# Patient Record
Sex: FEMALE | Race: WHITE | NOT HISPANIC OR LATINO | Employment: UNEMPLOYED | URBAN - METROPOLITAN AREA
[De-identification: names, ages, dates, MRNs, and addresses within clinical notes are randomized per-mention and may not be internally consistent; named-entity substitution may affect disease eponyms.]

---

## 2020-08-07 ENCOUNTER — OFFICE VISIT (OUTPATIENT)
Dept: INTERNAL MEDICINE | Facility: CLINIC | Age: 50
End: 2020-08-07
Payer: COMMERCIAL

## 2020-08-07 ENCOUNTER — LAB VISIT (OUTPATIENT)
Dept: LAB | Facility: HOSPITAL | Age: 50
End: 2020-08-07
Attending: INTERNAL MEDICINE
Payer: COMMERCIAL

## 2020-08-07 ENCOUNTER — IMMUNIZATION (OUTPATIENT)
Dept: PHARMACY | Facility: CLINIC | Age: 50
End: 2020-08-07
Payer: COMMERCIAL

## 2020-08-07 VITALS
SYSTOLIC BLOOD PRESSURE: 112 MMHG | WEIGHT: 108 LBS | BODY MASS INDEX: 19.88 KG/M2 | HEIGHT: 62 IN | DIASTOLIC BLOOD PRESSURE: 68 MMHG

## 2020-08-07 DIAGNOSIS — Z00.00 ROUTINE HISTORY AND PHYSICAL EXAMINATION OF ADULT: ICD-10-CM

## 2020-08-07 DIAGNOSIS — F41.9 ANXIETY: ICD-10-CM

## 2020-08-07 DIAGNOSIS — Z11.59 NEED FOR HEPATITIS C SCREENING TEST: ICD-10-CM

## 2020-08-07 DIAGNOSIS — Z01.419 WELL WOMAN EXAM: ICD-10-CM

## 2020-08-07 DIAGNOSIS — Z12.39 BREAST CANCER SCREENING: ICD-10-CM

## 2020-08-07 DIAGNOSIS — Z00.00 ROUTINE HISTORY AND PHYSICAL EXAMINATION OF ADULT: Primary | ICD-10-CM

## 2020-08-07 DIAGNOSIS — Z87.892 HISTORY OF ANAPHYLAXIS: ICD-10-CM

## 2020-08-07 LAB
ALBUMIN SERPL BCP-MCNC: 4.3 G/DL (ref 3.5–5.2)
ALP SERPL-CCNC: 54 U/L (ref 55–135)
ALT SERPL W/O P-5'-P-CCNC: 11 U/L (ref 10–44)
ANION GAP SERPL CALC-SCNC: 6 MMOL/L (ref 8–16)
AST SERPL-CCNC: 18 U/L (ref 10–40)
BASOPHILS # BLD AUTO: 0.05 K/UL (ref 0–0.2)
BASOPHILS NFR BLD: 0.8 % (ref 0–1.9)
BILIRUB SERPL-MCNC: 0.4 MG/DL (ref 0.1–1)
BUN SERPL-MCNC: 9 MG/DL (ref 6–20)
CALCIUM SERPL-MCNC: 9.6 MG/DL (ref 8.7–10.5)
CHLORIDE SERPL-SCNC: 105 MMOL/L (ref 95–110)
CHOLEST SERPL-MCNC: 222 MG/DL (ref 120–199)
CHOLEST/HDLC SERPL: 4 {RATIO} (ref 2–5)
CO2 SERPL-SCNC: 28 MMOL/L (ref 23–29)
CREAT SERPL-MCNC: 0.8 MG/DL (ref 0.5–1.4)
DIFFERENTIAL METHOD: ABNORMAL
EOSINOPHIL # BLD AUTO: 0.1 K/UL (ref 0–0.5)
EOSINOPHIL NFR BLD: 2.1 % (ref 0–8)
ERYTHROCYTE [DISTWIDTH] IN BLOOD BY AUTOMATED COUNT: 13.9 % (ref 11.5–14.5)
EST. GFR  (AFRICAN AMERICAN): >60 ML/MIN/1.73 M^2
EST. GFR  (NON AFRICAN AMERICAN): >60 ML/MIN/1.73 M^2
ESTIMATED AVG GLUCOSE: 108 MG/DL (ref 68–131)
GLUCOSE SERPL-MCNC: 90 MG/DL (ref 70–110)
HBA1C MFR BLD HPLC: 5.4 % (ref 4–5.6)
HCT VFR BLD AUTO: 40.6 % (ref 37–48.5)
HDLC SERPL-MCNC: 55 MG/DL (ref 40–75)
HDLC SERPL: 24.8 % (ref 20–50)
HGB BLD-MCNC: 12.5 G/DL (ref 12–16)
IMM GRANULOCYTES # BLD AUTO: 0.02 K/UL (ref 0–0.04)
IMM GRANULOCYTES NFR BLD AUTO: 0.3 % (ref 0–0.5)
LDLC SERPL CALC-MCNC: 140 MG/DL (ref 63–159)
LYMPHOCYTES # BLD AUTO: 1.2 K/UL (ref 1–4.8)
LYMPHOCYTES NFR BLD: 19.6 % (ref 18–48)
MCH RBC QN AUTO: 28.3 PG (ref 27–31)
MCHC RBC AUTO-ENTMCNC: 30.8 G/DL (ref 32–36)
MCV RBC AUTO: 92 FL (ref 82–98)
MONOCYTES # BLD AUTO: 0.6 K/UL (ref 0.3–1)
MONOCYTES NFR BLD: 9.2 % (ref 4–15)
NEUTROPHILS # BLD AUTO: 4.2 K/UL (ref 1.8–7.7)
NEUTROPHILS NFR BLD: 68 % (ref 38–73)
NONHDLC SERPL-MCNC: 167 MG/DL
NRBC BLD-RTO: 0 /100 WBC
PLATELET # BLD AUTO: 206 K/UL (ref 150–350)
PMV BLD AUTO: 12.2 FL (ref 9.2–12.9)
POTASSIUM SERPL-SCNC: 4.2 MMOL/L (ref 3.5–5.1)
PROT SERPL-MCNC: 7.5 G/DL (ref 6–8.4)
RBC # BLD AUTO: 4.41 M/UL (ref 4–5.4)
SODIUM SERPL-SCNC: 139 MMOL/L (ref 136–145)
TRIGL SERPL-MCNC: 135 MG/DL (ref 30–150)
TSH SERPL DL<=0.005 MIU/L-ACNC: 2.28 UIU/ML (ref 0.4–4)
WBC # BLD AUTO: 6.11 K/UL (ref 3.9–12.7)

## 2020-08-07 PROCEDURE — 99396 PR PREVENTIVE VISIT,EST,40-64: ICD-10-PCS | Mod: S$GLB,,, | Performed by: INTERNAL MEDICINE

## 2020-08-07 PROCEDURE — 84443 ASSAY THYROID STIM HORMONE: CPT

## 2020-08-07 PROCEDURE — 36415 COLL VENOUS BLD VENIPUNCTURE: CPT

## 2020-08-07 PROCEDURE — 85025 COMPLETE CBC W/AUTO DIFF WBC: CPT

## 2020-08-07 PROCEDURE — 80061 LIPID PANEL: CPT

## 2020-08-07 PROCEDURE — 83036 HEMOGLOBIN GLYCOSYLATED A1C: CPT

## 2020-08-07 PROCEDURE — 99999 PR PBB SHADOW E&M-NEW PATIENT-LVL IV: ICD-10-PCS | Mod: PBBFAC,,, | Performed by: INTERNAL MEDICINE

## 2020-08-07 PROCEDURE — 80053 COMPREHEN METABOLIC PANEL: CPT

## 2020-08-07 PROCEDURE — 3008F PR BODY MASS INDEX (BMI) DOCUMENTED: ICD-10-PCS | Mod: CPTII,S$GLB,, | Performed by: INTERNAL MEDICINE

## 2020-08-07 PROCEDURE — 99999 PR PBB SHADOW E&M-NEW PATIENT-LVL IV: CPT | Mod: PBBFAC,,, | Performed by: INTERNAL MEDICINE

## 2020-08-07 PROCEDURE — 99396 PREV VISIT EST AGE 40-64: CPT | Mod: S$GLB,,, | Performed by: INTERNAL MEDICINE

## 2020-08-07 PROCEDURE — 86803 HEPATITIS C AB TEST: CPT

## 2020-08-07 PROCEDURE — 3008F BODY MASS INDEX DOCD: CPT | Mod: CPTII,S$GLB,, | Performed by: INTERNAL MEDICINE

## 2020-08-07 NOTE — PROGRESS NOTES
CHIEF COMPLAINT     Chief Complaint   Patient presents with    Establish Care     is getting established as a new patient.     Mammogram     reports that she has lumps in ANDREA breasts, has missed last (2) 6 mos appts. patient was seen [facility].     Annual Exam       HPI     Sylvia Chua is a 50 y.o. female here today for annual exam.  Patient has just moved back to [place] from [place].  Here today to reestablish primary care.    Significant health changes in the past year.  Patient is perimenopausal now.  She reports that she goes anywhere from 1-3 months between periods.  Also reports some mild hot flashes that are not particularly bothersome.    Anxiety  Patient reports she has recurred covering from back to anxiety.  Feels like symptoms were brought on regarding transition prior to moving and moving.  Reports her some financial strain, some family health strain with her parents.  She reports that she is anxious she is interested in speaking with somebody does not feel like symptoms were medication at this time.  No previous history of anxiety although she reports that her kids reports that she has always been anxious.    Subclinical hypothyroidism  Had a TSH that was slightly elevated in 2009 with normal T4.  Subsequent TSH measurements boys been normal.  Patient denies any symptoms of hypo or hyperthyroidism.  Patient has never been evaluated by specialist.  Patient about medication prescription.    Personally Reviewed Patient's Medical, surgical, family and social hx. Changes updated in [facility].  Care Team updated in Epic    Review of Systems:  Review of Systems   Constitutional: Negative for fever and unexpected weight change.   Respiratory: Negative for cough.    Gastrointestinal: Negative for constipation and diarrhea.   Endocrine: Negative for cold intolerance and heat intolerance.   Genitourinary:        Oligomenorrhea  Hot flashes   Neurological: Negative for tremors.  "  Psychiatric/Behavioral: The patient is nervous/anxious.        Health Maintenance:   Reviewed with patient  Due for the following:      PHYSICAL EXAM     /68 (BP Location: Left arm, Patient Position: Sitting, BP Method: Medium (Manual))   Ht 5' 2" (1.575 m)   Wt 49 kg (108 lb)   BMI 19.75 kg/m²     Gen: Well Appearing, NAD  HEENT: PERR, EOMI  Neck: FROM, no thyromegaly, no cervical adenopathy  CVD: RRR, no M/R/G  Pulm: Normal work of breathing, CTAB, no wheezing  Abd:  Soft, NT, ND non TTP, no mass  MSK: no LE edema  Neuro: A&Ox3, gait normal, speech normal  Mood; Mood normal, behavior normal, thought process linear       LABS     Labs reviewed; ordered today    ASSESSMENT     1. Routine history and physical examination of adult  CBC auto differential    Comprehensive metabolic panel    Lipid panel    Hemoglobin A1c    TSH   2. Breast cancer screening  Mammo Digital Screening Bilat with CAD   3. Need for hepatitis C screening test  Hepatitis C antibody   4. Anxiety  Ambulatory referral/consult to Community Health Workers (CHW)   5. Well woman exam  Ambulatory referral/consult to Gynecology   6. History of anaphylaxis  EPINEPHrine 0.3 mg/0.3 mL Syrg           Plan     Sylvia Chua is a 50 y.o. female with  1. Routine history and physical examination of adult  Updated problem list, medical history, care team and discussed HM.   Shingrix today  Patient wants to think about colonoscopy  - CBC auto differential; Future  - Comprehensive metabolic panel; Future  - Lipid panel; Future  - Hemoglobin A1c; Future  - TSH; Future    2. Breast cancer screening  History of abnormal mammograms but no history of malignancy.  She is overdue  - Mammo Digital Screening Bilat with CAD; Future    3. Need for hepatitis C screening test  Never screened  - Hepatitis C antibody; Future    4. Anxiety  Symptoms are mild discussed lifestyle optimization, talk therapy and pharmacotherapy.  Patient adjusting pharmacotherapy " this time.  Interested in talking with family.  Will make referral.  If she does feel like warts medication would pick medication that may help with her hot flashes as well.  - Ambulatory referral/consult to Community Health Workers (CHW); Future    5. Well woman exam  Ready to establish the gyn  - Ambulatory referral/consult to Gynecology; Future    6. History of anaphylaxis   Bee venom is her trigger.  Will refill EpiPen  - EPINEPHrine 0.3 mg/0.3 mL Syrg; Inject 0.3 mLs (0.3 mg total) into the skin once. for 1 dose  Dispense: 2 each; Refill: 1      Cornelius Chan MD

## 2020-08-10 LAB — HCV AB SERPL QL IA: NEGATIVE

## 2020-08-15 ENCOUNTER — OFFICE VISIT (OUTPATIENT)
Dept: PRIMARY CARE CLINIC | Facility: CLINIC | Age: 50
End: 2020-08-15
Payer: COMMERCIAL

## 2020-08-15 DIAGNOSIS — R11.0 NAUSEA: ICD-10-CM

## 2020-08-15 DIAGNOSIS — Z20.822 CLOSE EXPOSURE TO COVID-19 VIRUS: ICD-10-CM

## 2020-08-15 DIAGNOSIS — R05.9 COUGH: Primary | ICD-10-CM

## 2020-08-15 DIAGNOSIS — A09 DIARRHEA OF INFECTIOUS ORIGIN: ICD-10-CM

## 2020-08-15 PROCEDURE — 99213 PR OFFICE/OUTPT VISIT, EST, LEVL III, 20-29 MIN: ICD-10-PCS | Mod: 95,,, | Performed by: FAMILY MEDICINE

## 2020-08-15 PROCEDURE — 99213 OFFICE O/P EST LOW 20 MIN: CPT | Mod: 95,,, | Performed by: FAMILY MEDICINE

## 2020-08-15 RX ORDER — AZITHROMYCIN 250 MG/1
TABLET, FILM COATED ORAL
Qty: 6 TABLET | Refills: 0 | Status: SHIPPED | OUTPATIENT
Start: 2020-08-15 | End: 2020-08-20

## 2020-08-15 NOTE — PROGRESS NOTES
Subjective:       Patient ID: Sylvia Chua is a 50 y.o. female.    Chief Complaint: No chief complaint on file.    The patient location is: home  The chief complaint leading to consultation is: nausea, diarrhea, cough, COVID 19 exposure    Visit type: audiovisual    Face to Face time with patient: 11 mins  15 minutes of total time spent on the encounter, which includes face to face time and non-face to face time preparing to see the patient (eg, review of tests), Obtaining and/or reviewing separately obtained history, Documenting clinical information in the electronic or other health record, Independently interpreting results (not separately reported) and communicating results to the patient/family/caregiver, or Care coordination (not separately reported).         Each patient to whom he or she provides medical services by telemedicine is:  (1) informed of the relationship between the physician and patient and the respective role of any other health care provider with respect to management of the patient; and (2) notified that he or she may decline to receive medical services by telemedicine and may withdraw from such care at any time.    Notes: 51 yo female went to the assisted living facility 6 days ago to attend to the care of her father who requires assistance with his daily needs. They found out the following day that their nurse tested positive for COVID 19. Four days ago both her and her father were tested for COVID 19; father's tested returned positive but she resulted negative. For 5 days she has been experiencing nausea, diarrhea and dry cough. She is very concerned; she is asking about medications including azithromycin.    The following portions of the patient's history were reviewed and updated as appropriate: allergies, current medications, past medical history, and problem list.    Cough  This is a new problem. The current episode started in the past 7 days. The problem has been unchanged.  The problem occurs hourly. The cough is non-productive. Associated symptoms include headaches, heartburn, myalgias, nasal congestion, postnasal drip, rhinorrhea and sweats. The treatment provided no relief. There is no history of asthma, bronchiectasis, bronchitis, COPD, emphysema, environmental allergies or pneumonia.     Review of Systems   Constitutional: Negative for activity change, appetite change and diaphoresis.   HENT: Positive for postnasal drip and rhinorrhea.    Respiratory: Positive for cough.    Gastrointestinal: Positive for heartburn.   Musculoskeletal: Positive for myalgias.   Allergic/Immunologic: Negative for environmental allergies.   Neurological: Positive for headaches.         Objective:     There were no vitals filed for this visit.  Physical Exam  Constitutional:       General: She is not in acute distress.     Appearance: She is not ill-appearing, toxic-appearing or diaphoretic.   Pulmonary:      Effort: Pulmonary effort is normal.   Neurological:      Mental Status: She is alert and oriented to person, place, and time.   Psychiatric:      Comments: crying         Assessment:       1. Cough    2. Diarrhea of infectious origin    3. Nausea    4. Close Exposure to Covid-19 Virus        Plan:       1. Cough  -     azithromycin (Z-CRISSY) 250 MG tablet; Take 2 tablets by mouth on day 1; Take 1 tablet by mouth on days 2-5  Dispense: 6 tablet; Refill: 0  Discussed that this should be keep on hand in the event that cough becomes productive. Otherwise there is no need to treat viruses with antibiotics.    2. Diarrhea of infectious origin  Stay hydrated.    3. Nausea  Seek care in the event of persistent vomiting leading to dehydration.    4. Close Exposure to Covid-19 Virus  She had negative test 4 days ago; treat as if she were COVID 19 positive with supportive measures. Should she desire retesting, this can be done at a community facility for which she is aware having undergone testing previously.  Continue to practice physical distancing; hand washing and wear face masks at all times; discontinue home isolation once recovered and afebrile for 3 days without use of antipyretics.    Follow up with PCP or seek immediate care if symptoms worsen    Disclaimer: This note has been generated using voice-recognition software. There may be typographical errors that have been missed during proof-reading

## 2020-08-18 ENCOUNTER — TELEPHONE (OUTPATIENT)
Dept: BEHAVIORAL HEALTH | Facility: CLINIC | Age: 50
End: 2020-08-18

## 2020-08-18 NOTE — PROGRESS NOTES
Behavioral Health Community Health Worker  Initial Assessment  Completed by:  Terence Shah    Date:  8/18/2020    Patient Enrollment in Behavioral Health Program:  · Patient verbalized understanding of Behavioral Health Integration services to include:  · Patient understands that CHW, LCSW, PharmD and consulting Psychiatrist are members of the care team working collaboratively with his/her primary care provider: Yes  · Patient understands that activation of their [facility] patient portal account is required for accessing the full scope of team services: Yes  · Patient understands that some counseling sessions may occur via video: Yes  · Clinic visits with the psychiatrist may be subject to a co-pay based on your insurance: Yes  · Patient consents to enroll in BHI program: Yes    Assessments     Single Item Health Literacy Scale:  · How often do you need to have someone help you read instructions, pamphlets or other written material from your doctor or pharmacy?: Never    Promis 10:  · Promis 10 Responses  · In general, would you say your health is: Very good  · In general, would you say your quality of life is: Very good  · In general, how would you rate your physical health?: Very good  · In general, how would you rate your mental health, including your mood and your ability to think?: Fair  · In general, how would you rate your satisfaction with your social activities and relationships?: Fair  · In general, please rate how well you carry out your usual social activities and roles. (This includes activities at home, at work and in your community, and responsibilities as a parent, child, spouse, employee, friend, etc.): Excellent  · To what extent are you able to carry out your everyday physical activities such as walking, climbing stairs, carrying groceries, or moving a chair? : Completely  · In the past 7 days, how often have you been bothered by emotional problems such as feeling anxious, depressed or  irritable?: Always  · In the past 7 days, how would you rate your fatigue on average?: Severe  · In the past 7 days, on a scale of 0 to 10 (where 0 is no pain and 10 is the worst pain imaginable) how would you rate your pain on average?: 2  · Global Physical Health: 15  · Global Mental health Score: 13    Depression PHQ:  PHQ9 8/18/2020   Total Score 10         Generalized Anxiety Disorder 7-Item Scale:  GAD7 8/18/2020   1. Feeling nervous, anxious, or on edge? 2   2. Not being able to stop or control worrying? 2   3. Worrying too much about different things? 2   4. Trouble relaxing? 2   5. Being so restless that it is hard to sit still? 2   6. Becoming easily annoyed or irritable? 0   7. Feeling afraid as if something awful might happen? 2   8. If you checked off any problems, how difficult have these problems made it for you to do your work, take care of things at home, or get along with other people? 1   MISTY-7 Score 12       History     Social History     Socioeconomic History    Marital status:      Spouse name: Not on file    Number of children: Not on file    Years of education: Not on file    Highest education level: Not on file   Occupational History    Occupation: Homemaker   Social Needs    Financial resource strain: Not on file    Food insecurity     Worry: Not on file     Inability: Not on file    Transportation needs     Medical: Not on file     Non-medical: Not on file   Tobacco Use    Smoking status: Never Smoker   Substance and Sexual Activity    Alcohol use: Yes     Frequency: 2-3 times a week     Drinks per session: 1 or 2     Binge frequency: Never    Drug use: No    Sexual activity: Not on file   Lifestyle    Physical activity     Days per week: 0 days     Minutes per session: 0 min    Stress: To some extent   Relationships    Social connections     Talks on phone: More than three times a week     Gets together: More than three times a week     Attends [employer] service: Not  "on file     Active member of club or organization: No     Attends meetings of clubs or organizations: Never     Relationship status:    Other Topics Concern    Not on file   Social History Narrative    Not on file       Call Summary     Patient was referred to the BHI (Non-opioid) program by Primary Care Provider, Dr. Chan CHW contacted Sylvia Chua who reports depression and anxiety  that limits [her] activities of daily living (ADLs).   Patient scored "10" on the PHQ9 and "12" on the MISTY 7. Based on these scores patient is eligible for the Behavioral health Integration (Non-opioid) Program. [facility] completed the intake and scheduled an appointment for patient with Marito Galindo LCSW, on Wednesday September 16,2020 at 10am.         "

## 2020-09-15 ENCOUNTER — OFFICE VISIT (OUTPATIENT)
Dept: INTERNAL MEDICINE | Facility: CLINIC | Age: 50
End: 2020-09-15
Payer: COMMERCIAL

## 2020-09-15 VITALS
HEIGHT: 62 IN | BODY MASS INDEX: 20.16 KG/M2 | DIASTOLIC BLOOD PRESSURE: 66 MMHG | OXYGEN SATURATION: 97 % | WEIGHT: 109.56 LBS | HEART RATE: 63 BPM | SYSTOLIC BLOOD PRESSURE: 96 MMHG

## 2020-09-15 DIAGNOSIS — L23.7 POISON IVY DERMATITIS: ICD-10-CM

## 2020-09-15 DIAGNOSIS — L25.9 CONTACT DERMATITIS, UNSPECIFIED CONTACT DERMATITIS TYPE, UNSPECIFIED TRIGGER: Primary | ICD-10-CM

## 2020-09-15 PROCEDURE — 99999 PR PBB SHADOW E&M-EST. PATIENT-LVL III: ICD-10-PCS | Mod: PBBFAC,,, | Performed by: NURSE PRACTITIONER

## 2020-09-15 PROCEDURE — 99214 PR OFFICE/OUTPT VISIT, EST, LEVL IV, 30-39 MIN: ICD-10-PCS | Mod: 25,S$GLB,, | Performed by: NURSE PRACTITIONER

## 2020-09-15 PROCEDURE — 99999 PR PBB SHADOW E&M-EST. PATIENT-LVL III: CPT | Mod: PBBFAC,,, | Performed by: NURSE PRACTITIONER

## 2020-09-15 PROCEDURE — 99214 OFFICE O/P EST MOD 30 MIN: CPT | Mod: 25,S$GLB,, | Performed by: NURSE PRACTITIONER

## 2020-09-15 PROCEDURE — 3008F BODY MASS INDEX DOCD: CPT | Mod: CPTII,S$GLB,, | Performed by: NURSE PRACTITIONER

## 2020-09-15 PROCEDURE — 3008F PR BODY MASS INDEX (BMI) DOCUMENTED: ICD-10-PCS | Mod: CPTII,S$GLB,, | Performed by: NURSE PRACTITIONER

## 2020-09-15 PROCEDURE — 96372 THER/PROPH/DIAG INJ SC/IM: CPT | Mod: S$GLB,,, | Performed by: NURSE PRACTITIONER

## 2020-09-15 PROCEDURE — 96372 PR INJECTION,THERAP/PROPH/DIAG2ST, IM OR SUBCUT: ICD-10-PCS | Mod: S$GLB,,, | Performed by: NURSE PRACTITIONER

## 2020-09-15 RX ORDER — TRIAMCINOLONE ACETONIDE 1 MG/G
OINTMENT TOPICAL 2 TIMES DAILY
Qty: 30 G | Refills: 2 | Status: SHIPPED | OUTPATIENT
Start: 2020-09-15 | End: 2022-02-07

## 2020-09-15 RX ORDER — TRIAMCINOLONE ACETONIDE 40 MG/ML
40 INJECTION, SUSPENSION INTRA-ARTICULAR; INTRAMUSCULAR
Status: COMPLETED | OUTPATIENT
Start: 2020-09-15 | End: 2020-09-15

## 2020-09-15 RX ORDER — HYDROXYZINE PAMOATE 25 MG/1
25 CAPSULE ORAL EVERY 4 HOURS PRN
Qty: 30 CAPSULE | Refills: 0 | Status: SHIPPED | OUTPATIENT
Start: 2020-09-15 | End: 2022-02-07

## 2020-09-15 RX ADMIN — TRIAMCINOLONE ACETONIDE 40 MG: 40 INJECTION, SUSPENSION INTRA-ARTICULAR; INTRAMUSCULAR at 08:09

## 2020-09-15 NOTE — PROGRESS NOTES
"INTERNAL MEDICINE CLINIC - SAME DAY APPOINTMENT  Progress Note    PRESENTING HISTORY     PCP: Cornelius Chan MD  Chief Complaint/Reason for Visit:   No chief complaint on file.      History of Present Illness & ROS : Ms. Sylvia Chua is a 50 y.o. female.   Here for Same Day appt.   Reports that on Friday, was in [place] ([place]) and went biking on trail with her son. She and her  slept in the same bed, but she reportedly was the only one that awakened on Sunday with "burning and itching around waist line, that spread to other places now".   has "nothing; no rash; no bumps". Admits to "having history of poison ivy" exposures in the past when go out into the woody trails".     No fever, denies vesicles.    Review of Systems:  Eyes: denies visual changes at this time denies floaters   ENT: no nasal congestion or sore throat  Respiratory: no cough or shorness of breath  Cardiovascular: no chest pain or palpitations  Gastrointestinal: no nausea or vomiting, no abdominal pain or change in bowel habits  Genitourinary: no hematuria or dysuria; denies frequency  Hematologic/Lymphatic: no easy bruising or lymphadenopathy  Musculoskeletal: no arthralgias or myalgias  Neurological: no seizures or tremors  Endocrine: no heat or cold intolerance      PAST HISTORY:     Past Medical History:   Diagnosis Date    Dizziness     Migraine headache        No past surgical history on file.    Family History   Problem Relation Age of Onset    Diabetes Mother     Coronary artery disease Mother     Ataxia Father     Coronary artery disease Brother     Stroke Brother     COPD Brother        Social History     Socioeconomic History    Marital status:      Spouse name: Not on file    Number of children: Not on file    Years of education: Not on file    Highest education level: Not on file   Occupational History    Occupation: Homemaker   Social Needs    Financial resource strain: Not on file    " Food insecurity     Worry: Not on file     Inability: Not on file    Transportation needs     Medical: Not on file     Non-medical: Not on file   Tobacco Use    Smoking status: Never Smoker   Substance and Sexual Activity    Alcohol use: Yes     Frequency: 2-3 times a week     Drinks per session: 1 or 2     Binge frequency: Never    Drug use: No    Sexual activity: Not on file   Lifestyle    Physical activity     Days per week: 0 days     Minutes per session: 0 min    Stress: To some extent   Relationships    Social connections     Talks on phone: More than three times a week     Gets together: More than three times a week     Attends Rastafarian service: Not on file     Active member of club or organization: No     Attends meetings of clubs or organizations: Never     Relationship status:    Other Topics Concern    Not on file   Social History Narrative    Not on file       MEDICATIONS & ALLERGIES:     Current Outpatient Medications on File Prior to Visit   Medication Sig Dispense Refill    EPINEPHrine 0.3 mg/0.3 mL Syrg Inject 0.3 mLs (0.3 mg total) into the skin once. for 1 dose 2 each 1     No current facility-administered medications on file prior to visit.         Review of patient's allergies indicates:  No Known Allergies    Medications Reconciliation:   I have reconciled the patient's home medications with the patient/family. I have updated all changes.  Refer to After-Visit Medication List.    OBJECTIVE:     Vital Signs:  There were no vitals filed for this visit.  Wt Readings from Last 1 Encounters:   08/07/20 0813 49 kg (108 lb)     There is no height or weight on file to calculate BMI.     Wt Readings from Last 3 Encounters:   09/15/20 49.7 kg (109 lb 9.1 oz)   08/07/20 49 kg (108 lb)   09/24/13 49 kg (108 lb)     Temp Readings from Last 3 Encounters:   No data found for Temp     BP Readings from Last 3 Encounters:   09/15/20 96/66   08/07/20 112/68   09/09/13 123/70     Pulse Readings  from Last 3 Encounters:   09/15/20 63   09/09/13 73       Physical Exam:  General: Well developed, well nourished. No distress.  HEENT: Head is normocephalic, atraumatic; ears are normal.   Eyes: Clear conjunctiva.  Neck: Supple, symmetrical neck; trachea midline.  Lungs: Clear to auscultation bilaterally and normal respiratory effort.  Cardiovascular: Heart with regular rate and rhythm. No murmurs, gallops or rubs  Extremities: No LE edema. Pulses 2+ and symmetric.   Abdomen: Abdomen is soft, non-tender non-distended with normal bowel sounds.  Skin: Skin color, texture, turgor normal.  Linear erythematous based exanthem, no vesicles, to waist line and torso region, Left side is > right side.   Musculoskeletal: Normal gait.   Lymph Nodes: No cervical or supraclavicular adenopathy.  Neurologic: Normal strength and tone. No focal numbness or weakness.   Psychiatric: Not depressed.        Laboratory  Lab Results   Component Value Date    WBC 6.11 08/07/2020    HGB 12.5 08/07/2020    HCT 40.6 08/07/2020     08/07/2020    CHOL 222 (H) 08/07/2020    TRIG 135 08/07/2020    HDL 55 08/07/2020    ALT 11 08/07/2020    AST 18 08/07/2020     08/07/2020    K 4.2 08/07/2020     08/07/2020    CREATININE 0.8 08/07/2020    BUN 9 08/07/2020    CO2 28 08/07/2020    TSH 2.279 08/07/2020    HGBA1C 5.4 08/07/2020       ASSESSMENT & PLAN:     Here for Same Day appt.     Contact dermatitis, unspecified contact dermatitis type, unspecified trigger likely from recent Poison Ivy Exposure:     Poison ivy dermatitis    Other orders  -     hydrOXYzine pamoate (VISTARIL) 25 MG Cap; Take 1 capsule (25 mg total) by mouth every 4 (four) hours as needed (Itching).  Dispense: 30 capsule; Refill: 0  -     triamcinolone acetonide injection 40 mg  -     triamcinolone acetonide 0.1% (KENALOG) 0.1 % ointment; Apply topically 2 (two) times daily.  Dispense: 30 g; Refill: 2      Future Appointments   Date Time Provider Department Center    9/16/2020 10:00 AM Marito Galindo, JOSÉ MIGUELW [facility] PRBHI [facility]   9/22/2020  9:00 AM Vashti Saleh NP [facility] UROGYN [facility]   9/22/2020 10:00 AM [facility] [id number] SCREEN INT MED [facility] MAMID [facility]   11/9/2020 10:00 AM Cornelius Chan MD [facility] IM [facility]        Medication List          Accurate as of September 15, 2020  9:02 AM. If you have any questions, ask your nurse or doctor.            START taking these medications    hydrOXYzine pamoate 25 MG Cap  Commonly known as: VISTARIL  Take 1 capsule (25 mg total) by mouth every 4 (four) hours as needed (Itching).  Started by: KIKE Chandra     triamcinolone acetonide 0.1% 0.1 % ointment  Commonly known as: KENALOG  Apply topically 2 (two) times daily.  Started by: KIKE Chandra        CONTINUE taking these medications    EPINEPHrine 0.3 mg/0.3 mL Syrg  Inject 0.3 mLs (0.3 mg total) into the skin once. for 1 dose           Where to Get Your Medications      These medications were sent to [facility]/pharmacy #[id number] - [place], [place] - [address]  [address] [address]    Hours: 24-hours Phone: [phone]   · hydrOXYzine pamoate 25 MG Cap  · triamcinolone acetonide 0.1% 0.1 % ointment       Signing Physician:  KIKE Chandra

## 2020-09-16 ENCOUNTER — OFFICE VISIT (OUTPATIENT)
Dept: BEHAVIORAL HEALTH | Facility: CLINIC | Age: 50
End: 2020-09-16
Payer: COMMERCIAL

## 2020-09-16 DIAGNOSIS — F41.1 GAD (GENERALIZED ANXIETY DISORDER): Primary | ICD-10-CM

## 2020-09-16 DIAGNOSIS — F41.9 ANXIETY: ICD-10-CM

## 2020-09-16 PROCEDURE — 90791 PSYCH DIAGNOSTIC EVALUATION: CPT | Mod: 95,,, | Performed by: SOCIAL WORKER

## 2020-09-16 PROCEDURE — 90791 PR PSYCHIATRIC DIAGNOSTIC EVALUATION: ICD-10-PCS | Mod: 95,,, | Performed by: SOCIAL WORKER

## 2020-09-16 NOTE — LETTER
September 16, 2020      Cornelius Chan MD  [address]  [facility] [address]           [facility]  [address]  [facility] [address]  Phone: [phone]  Fax: [phone]          Patient: Sylvia Chua   MR Number: [id number]   YOB: 1970   Date of Visit: 9/16/2020       Dear Dr. Cornelius Chan:    Thank you for referring Sylvia Chua to me for evaluation. Attached you will find relevant portions of my assessment and plan of care.    If you have questions, please do not hesitate to call me. I look forward to following Sylvia Chua along with you.    Sincerely,    Marito Galindo, [facility]    Enclosure  CC:  No Recipients    If you would like to receive this communication electronically, please contact [email or web address] or [phone] to request more information on [employer] Link access.    For providers and/or their staff who would like to refer a patient to [facility], please contact us through our one-stop-shop provider referral line, [facility] [place], at [phone].    If you feel you have received this communication in error or would no longer like to receive these types of communications, please e-mail [email or web address]

## 2020-09-16 NOTE — PROGRESS NOTES
"[facility] BEHAVIORAL HEALTH INTEGRATION INTAKE    DATE:  9/16/2020  REFERRAL SOURCE:  Cornelius Chan MD  TYPE OF VISIT:  Video Session  LENGTH OF SESSION: 60  .The patient location is: AT home     Visit type: audiovisual    Face to Face time with patient: 60  85 minutes of total time spent on the encounter, which includes face to face time and non-face to face time preparing to see the patient (eg, review of tests), Obtaining and/or reviewing separately obtained history, Documenting clinical information in the electronic or other health record, Independently interpreting results (not separately reported) and communicating results to the patient/family/caregiver, or Care coordination (not separately reported).         Each patient to whom he or she provides medical services by telemedicine is:  (1) informed of the relationship between the physician and patient and the respective role of any other health care provider with respect to management of the patient; and (2) notified that he or she may decline to receive medical services by telemedicine and may withdraw from such care at any time.    Notes:     HISTORY OF PRESENTING ILLNESS:  Sylvia Chua, a 50 y.o. female with history of Anxiety disorders; anxiety, unspecified [F41.9] and MAJOR DEPRESSIVE DISORDER, SINGLE EPISODE, Moderate (F32.1). When  was out of work last year, I was not suicidal, but I could for the first time ever understand the point at which someone goes, "this is just not worth it."    CHIEF COMPLAINT/REASON FOR ENCOUNTER: Pt presented for initial evaluation for the Primary Care Behavioral Health Integration Program. Met with patient. Pt's chief complaint includes the following: anxiety and depression.       Patient does not currently have a psychiatrist.    Patient does not currently have a therapist.    Pt is taking  hydroxyzine (Vistrail) 25mg once daily for itching from poison ivy. They are not interested in medication changes at " "this time, but open if sx's of anxiety increase.    Current symptoms:  · Depression: dysphoric mood, anhedonia, "I use to exercise" and worthlessness/guilt, "I should have been working all of these years, I am not taking better care of dad, neglecting the children growing up, not paying attention." At time I feel hopeless, I am 50 years old. Stress eating.   · Anxiety: excessive worrying, restlessness and obsessions  · Insomnia: frequent night time awakening.  · April:  denies.  · Psychosis: denies .    PHQ9 8/18/2020   Total Score 10     GAD7 8/18/2020   1. Feeling nervous, anxious, or on edge? 2   2. Not being able to stop or control worrying? 2   3. Worrying too much about different things? 2   4. Trouble relaxing? 2   5. Being so restless that it is hard to sit still? 2   6. Becoming easily annoyed or irritable? 0   7. Feeling afraid as if something awful might happen? 2   8. If you checked off any problems, how difficult have these problems made it for you to do your work, take care of things at home, or get along with other people? 1   MISTY-7 Score 12        Current social stressors:   MOVES AND CAREGIVER STRESS  Patient has just moved back to [place] from [place], [place]. 3 days after getting to [place], father was diagnosed with COVID. Pt moved here in [place]. Before [place] pt and  lived in [place], [place], due to her 's company; which made eye-glasses, he was with that company for 26 years. They moved around "a lot" for his job. Lived in several  states. PT reported her  quit his job so they could move back to [place], so pt could be close to her brother. They also moved her father (Ed) to a NH down the street from her brother.     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 Pt reported due to the aforementioned she realized "I have not been happy for the 5 years." For the past 5 years she has been with her brother and father everyday caring for them.     FAMILY  Bianca (age 22) is getting  on Saturday to [facility]. They have been together since age 14. She stated, "This is wedding number 3 that we have planned." They were suppose to get  June 12th, then September 2nd (in [place]), and now in [place] this weekend.     HEALTH  She has hx of headaches and vertigo in July of 2013 through November of 2013.      Risk assessment:  Patient reports no suicidal ideation  Patient reports no homicidal ideation  Patient reports no self-injurious behavior  Patient reports no violent behavior    PSYCHIATRIC HISTORY:  History of April or diagnosis of Bipolar Disorder in the past:  No  History of Psychosis or diagnosis of Schizophrenia in the past:  No  Previous Psychiatric Hospitalizations:  No  Previous SI/HI:   No  Previous Suicide Attempts:  No  Previous Medication Trials: No   Previous Psychiatric Outpatient Treatment:  Yes - when the children were 3 or 4, I saw counselor 3x's and then it was expensive. IT was also about work and identity, It was an issue of I should go to work.   History of Trauma:  No  History of Violence:  No  Access to a Gun:  Yes, it makes me a nervous wreck, I have no idea where they are.     SUBSTANCE ABUSE HISTORY:  Tobacco:  No   Alcohol: 1 beer every night  Illicit Substances: No  Misuse of Prescription Medications:  No    MEDICAL HISTORY:  Past Medical History:   Diagnosis Date    Dizziness     Migraine headache        NEUROLOGIC HISTORY:  Seizures:  No  Head trauma:  No  Memory loss:  No    SOCIAL HISTORY (MARRIAGE, EMPLOYMENT, etc.):  Living Situation: Lives with  ("Seferino")  Family Life Cycle: Growing up was not bad. In my head by dad (Ed, "86 and disabled") was a fabulous man, and my mother (Phoebe "I did okay with her." Pt was the 4th child " "(youngest). She grew up in [place] and the family moved to [facility], when pt was 15.  Family: PT has 2 children wit her . Children are in [place]. The kids have always lived together. Renaldo (23) just graduated. He was home schooled all of the way through high school by pt. Bianca (22) has another year at [employer].   Nuclear/Marriage:  27 years, 28 in November, met in college.   Extended Family: Brother (Kanu) 13 years older, Brunilda (8 years older than pt, she was the mother figure), Sherice (55),    Supports: Feels alone, when dealing with her family. HE is financially supportive.   Education/Vocation: Went to [employer] graduated in political science, because her father was an  and studied political science. She wanted to be a  at the time.   Pt reported she has struggled with deciding what she wants to do for most of her life. PT reported she stopped working when she had kids, and when they got older she started home schooling them. Pt reported Bianca was home school through 8th grade and her son was home schooled through high school by pt. 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 She then reported, "I don't want to do that anymore." Pt reported she does not like "living in chaos." Despite this, she began renovating their current apt.   Hoahaoism/Spirituality: PT grew up very Zoroastrian, now described herself as agnostic. Pt stated, "I believe God."   Hobbies and Interests: exercise, hiking     PSYCHIATRIC FAMILY HISTORY:  Mother was chronically depressed, had dementia. Brother (Kanu) had anxiety.     MENTAL HEALTH STATUS EXAM  General Appearance:  unremarkable, age appropriate   Speech: normal tone, normal rate, normal volume, high pitch      Level of Cooperation: cooperative      Thought Processes: goal-directed, illogical   Mood: anxious, depressed      Thought Content: normal, no suicidality, no " "homicidality, delusions, or paranoia   Affect: sad, anxious, tearful   Orientation: Oriented x3   Memory: recent >  intact, remote >  intact   Attention Span & Concentration: intact   Fund of General Knowledge: intact and appropriate to age and level of education   Abstract Reasoning: appropriate   Judgment & Insight: intact     Language  intact       IMPRESSION:   My diagnostic impression is Anxiety disorders; generalized anxiety disorder [F41.1] and Major Depressive Disorder, Recurrent, Moderate (F33.1), see hx of diagnosis (top)    PROVISIONAL DIAGNOSES:  1. MISTY (generalized anxiety disorder)    2. Anxiety         STRENGTHS AND LIABILITIES: Strength: Patient is expressive/articulate., Strength: Patient is intelligent., Strength: Patient is motivated for change., Liability: Patient lacks coping skills.    TREATMENT GOALS: Anxiety: eliminating assumptions about dangerousness of anxiety, positive value of worry, or other assumptions (specify "should" statement and other cognitive distortions), reducing negative automatic thoughts and reducing time spent worrying (<30 minutes/day)    PLAN: In this session a psych evaluation was conducted to get history and process pt's life. CBT, Interpersonal Processing Therapy  and Relaxation Techniques  will be utilized in future individual  therapy sessions to increase interaction, insight, support and behavior modification.     RETURN TO CLINIC: Follow up in about 20 days (around 10/6/2020).       "

## 2020-09-16 NOTE — PATIENT INSTRUCTIONS
"Cognitive behavioral therapy (usually referred to as CBT) is based upon the idea that how you think determines how you feel and how you behave. The diagram and example below show us this process:        Something happens. It could be anything. You have thoughts about what has just occurred. You experience emotions based upon your thoughts. You respond to your thoughts and feelings with behaviors.               In anonymous support groups (e.g., AA or NA) they call this "stinking thinking" I call "cognitive distortions" unhelpful thinking styles because if you entertain them too long they negatively affect mood, increasing sadness or depression and/or anxiety.     Cognitive distortions are irrational thoughts that can influence your emotions. Everyone experiences cognitive distortions to some degree, but in their more extreme forms they can be harmful.  Magnification and Minimization: Exaggerating or minimizing the importance of events. One might believe their own achievements are unimportant, or that their mistakes are excessively important.   Catastrophizing: Seeing only the worst possible outcomes of a situation.  Overgeneralization: Making broad interpretations from a single or few events. I felt awkward during my job interview. I am always so awkward.  Magical Thinking: The belief that acts will influence unrelated situations. I am a good person--bad things shouldnt happen to me.  Personalization: The belief that one is responsible for events outside of their own control. My mom is always upset. She would be fine if I did more to help her.  Jumping to Conclusions: Interpreting the meaning of a situation with little or no evidence.  Mind Reading: Interpreting the thoughts and beliefs of others without adequate evidence. She would not go on a date with me. She probably thinks Im ugly.  Fortune Telling: The expectation that a situation will turn out badly without adequate evidence.  Emotional Reasoning: " "The assumption that emotions reflect the way things really are. I feel like a bad friend, therefore I must be a bad friend.  Mental Filter: You pick out a negative single issue and dwell on it, like a drop of ink that discolors a whole beaker of water; which in turns into Disqualifying the Positive: Recognizing only the negative aspects of a situation or interaction, while ignoring the positive. One might receive many compliments on an evaluation, but focus on the single piece of negative feedback.  Should Statements: The belief that things should be a certain way. Having pre-conditions on how you or other people should be. Judgmental and unforgiving expectations using musts and should create a lot of anxiety. I should always be friendly.  All-or-Nothing Thinking: Thinking in absolutes such as always, never, or every. Things are seen in black and white terms, where there are no shades of gray. If you make a mistake you think you have "failed" or are a "failure." Example: I never do a good enough job on anything.  Playing the Comparison Game: Comparing yourself to others and needing to keep up with or outshine others to feel good about yourself. Example: "He is so much smarter than me - I'm stupid."  Blaming- You blame yourself or others for the problems in your life, giving up control of your feelings and reactions. This is victim mentality. Example: He makes me miserable.   Labeling: You label yourself or others by terms such as "lazy," "fat," "stupid," "jerk," stating them like they are facts. A label becomes an erroneously or incorrect evaluation of self-worth. Examples: "I'm just fat and lazy" and "He's a jerk."    Write some examples of your own unhealthy thoughts and note which one or more cognitive errors fit.    For example:   Irrational thought     Types of cognitive Errors  ____________________________________________________________________________  Example: I am weird and always " will be  Labeling, Fortune Telling, All or nothing       To change distorted thinking, we need to look at the facts, and change the thought to a healthy thought    For example:   Cognitive Error:   Type:   Healthy Thought    I should go to the gym. Should statement I can go to the gym.

## 2020-09-18 NOTE — PROGRESS NOTES
I, Trice Crockett MD, have reviewed the LCSW's history and exam, and I agree with the assessment and plan.  Patient is not interested in medications at this time.  She would prefer to work on her anxiety through psychotherapy.  If symptoms do not improve with therapy, would consider addition of SSRI.    Time: 10 minutes

## 2020-09-21 ENCOUNTER — PATIENT OUTREACH (OUTPATIENT)
Dept: ADMINISTRATIVE | Facility: OTHER | Age: 50
End: 2020-09-21

## 2020-09-21 NOTE — PROGRESS NOTES
Care Everywhere: updated  Immunization: updated (delay in links systems)   Health Maintenance: updated  Media Review:   Legacy Review:   Order placed:   Upcoming appts: mammogram 9/22

## 2020-09-22 ENCOUNTER — IMMUNIZATION (OUTPATIENT)
Dept: PHARMACY | Facility: CLINIC | Age: 50
End: 2020-09-22
Payer: COMMERCIAL

## 2020-09-22 ENCOUNTER — HOSPITAL ENCOUNTER (OUTPATIENT)
Dept: RADIOLOGY | Facility: HOSPITAL | Age: 50
Discharge: HOME | End: 2020-09-22
Attending: INTERNAL MEDICINE
Payer: COMMERCIAL

## 2020-09-22 ENCOUNTER — OFFICE VISIT (OUTPATIENT)
Dept: UROGYNECOLOGY | Facility: CLINIC | Age: 50
End: 2020-09-22
Payer: COMMERCIAL

## 2020-09-22 VITALS
SYSTOLIC BLOOD PRESSURE: 99 MMHG | HEIGHT: 62 IN | WEIGHT: 105.81 LBS | DIASTOLIC BLOOD PRESSURE: 62 MMHG | BODY MASS INDEX: 19.47 KG/M2

## 2020-09-22 DIAGNOSIS — N93.9 ABNORMAL UTERINE BLEEDING: ICD-10-CM

## 2020-09-22 DIAGNOSIS — Z12.4 ENCOUNTER FOR SCREENING FOR CERVICAL CANCER: ICD-10-CM

## 2020-09-22 DIAGNOSIS — Z12.39 BREAST CANCER SCREENING: ICD-10-CM

## 2020-09-22 DIAGNOSIS — Z01.419 WELL WOMAN EXAM: Primary | ICD-10-CM

## 2020-09-22 DIAGNOSIS — Z12.31 ENCOUNTER FOR SCREENING MAMMOGRAM FOR BREAST CANCER: ICD-10-CM

## 2020-09-22 PROCEDURE — 88175 CYTOPATH C/V AUTO FLUID REDO: CPT

## 2020-09-22 PROCEDURE — 77063 BREAST TOMOSYNTHESIS BI: CPT | Mod: 26,,, | Performed by: RADIOLOGY

## 2020-09-22 PROCEDURE — 77063 MAMMO DIGITAL SCREENING BILAT WITH TOMO: ICD-10-PCS | Mod: 26,,, | Performed by: RADIOLOGY

## 2020-09-22 PROCEDURE — 3008F BODY MASS INDEX DOCD: CPT | Mod: CPTII,S$GLB,, | Performed by: NURSE PRACTITIONER

## 2020-09-22 PROCEDURE — 77067 SCR MAMMO BI INCL CAD: CPT | Mod: TC

## 2020-09-22 PROCEDURE — 87624 HPV HI-RISK TYP POOLED RSLT: CPT

## 2020-09-22 PROCEDURE — 99999 PR PBB SHADOW E&M-EST. PATIENT-LVL IV: CPT | Mod: PBBFAC,,, | Performed by: NURSE PRACTITIONER

## 2020-09-22 PROCEDURE — 99386 PR PREVENTIVE VISIT,NEW,40-64: ICD-10-PCS | Mod: S$GLB,,, | Performed by: NURSE PRACTITIONER

## 2020-09-22 PROCEDURE — 99999 PR PBB SHADOW E&M-EST. PATIENT-LVL IV: ICD-10-PCS | Mod: PBBFAC,,, | Performed by: NURSE PRACTITIONER

## 2020-09-22 PROCEDURE — 99386 PREV VISIT NEW AGE 40-64: CPT | Mod: S$GLB,,, | Performed by: NURSE PRACTITIONER

## 2020-09-22 PROCEDURE — 77067 SCR MAMMO BI INCL CAD: CPT | Mod: 26,,, | Performed by: RADIOLOGY

## 2020-09-22 PROCEDURE — 3008F PR BODY MASS INDEX (BMI) DOCUMENTED: ICD-10-PCS | Mod: CPTII,S$GLB,, | Performed by: NURSE PRACTITIONER

## 2020-09-22 PROCEDURE — 77067 MAMMO DIGITAL SCREENING BILAT WITH TOMO: ICD-10-PCS | Mod: 26,,, | Performed by: RADIOLOGY

## 2020-09-22 NOTE — LETTER
September 22, 2020      Cornelius Chan MD  [address]  [facility] [address]           Solitario Rick - OBGYN [facility]  [address]  [facility] [address]  Phone: [phone]          Patient: Sylvia Chua   MR Number: [id number]   YOB: 1970   Date of Visit: 9/22/2020       Dear Dr. Cornelius Chan:    Thank you for referring Sylvia Chua to me for evaluation. Attached you will find relevant portions of my assessment and plan of care.    If you have questions, please do not hesitate to call me. I look forward to following Sylvia Chua along with you.    Sincerely,    Vashti Saleh, NP    Enclosure  CC:  No Recipients    If you would like to receive this communication electronically, please contact [email or web address] or [phone] to request more information on [email or web address] Link access.    For providers and/or their staff who would like to refer a patient to [facility], please contact us through our one-stop-shop provider referral line, [facility], at [phone].    If you feel you have received this communication in error or would no longer like to receive these types of communications, please e-mail [email or web address]

## 2020-09-22 NOTE — PROGRESS NOTES
09/22/2020    SUBJECTIVE:   50 y.o. female for annual exam.    Past Medical History:   Diagnosis Date    Dizziness     Migraine headache        History reviewed. No pertinent surgical history.    Family History   Problem Relation Age of Onset    Diabetes Mother     Coronary artery disease Mother     Ataxia Father     Coronary artery disease Brother     Stroke Brother     COPD Brother        Social History     Socioeconomic History    Marital status:      Spouse name: Not on file    Number of children: Not on file    Years of education: Not on file    Highest education level: Not on file   Occupational History    Occupation: Homemaker   Social Needs    Financial resource strain: Not on file    Food insecurity     Worry: Not on file     Inability: Not on file    Transportation needs     Medical: Not on file     Non-medical: Not on file   Tobacco Use    Smoking status: Never Smoker   Substance and Sexual Activity    Alcohol use: Yes     Frequency: 2-3 times a week     Drinks per session: 1 or 2     Binge frequency: Never    Drug use: No    Sexual activity: Not on file   Lifestyle    Physical activity     Days per week: 0 days     Minutes per session: 0 min    Stress: To some extent   Relationships    Social connections     Talks on phone: More than three times a week     Gets together: More than three times a week     Attends Scientology service: Not on file     Active member of club or organization: No     Attends meetings of clubs or organizations: Never     Relationship status:    Other Topics Concern    Not on file   Social History Narrative    Not on file       Current Outpatient Medications   Medication Sig Dispense Refill    EPINEPHrine 0.3 mg/0.3 mL Syrg Inject 0.3 mLs (0.3 mg total) into the skin once. for 1 dose 2 each 1    hydrOXYzine pamoate (VISTARIL) 25 MG Cap Take 1 capsule (25 mg total) by mouth every 4 (four) hours as needed (Itching). (Patient not taking:  "Reported on 9/22/2020) 30 capsule 0    triamcinolone acetonide 0.1% (KENALOG) 0.1 % ointment Apply topically 2 (two) times daily. (Patient not taking: Reported on 9/22/2020) 30 g 2     No current facility-administered medications for this visit.        Review of patient's allergies indicates:  No Known Allergies    Patient's last menstrual period was 08/10/2020 (exact date).     Heavy bleeding first 2 days vs. Scant spotting x 5 days.  Has menses very irregularly.  Longest she has skipped has been 3 months.    Well Woman:  Pap: 2018- Normal  Mammo: 2018- previously abnormal- "multiple lumps" Should be going every 6 months  Colonoscopy: had not had one yet.   Dexa:    OB History    No obstetric history on file.           ROS:  Feeling well.   No dyspnea or chest pain on exertion.    No abdominal pain, change in bowel habits, black or bloody stools.    No urinary tract symptoms. Urinates q 3 hours. Denies nocturia.  GYN ROS: she complains of abnormal menstrual cycles that began about 2 years ago. "Sometimes light, sometimes not at all"  No neurological complaints.    OBJECTIVE:   The patient appears well, alert, oriented x 3, in no distress.  BP 99/62   Ht 5' 2" (1.575 m)   Wt 48 kg (105 lb 12.8 oz)   LMP 08/10/2020 (Exact Date)   BMI 19.35 kg/m²   ENT normal.  Neck supple. No adenopathy or thyromegaly. LUDWIN.   Lungs are clear, good air entry, no wheezes, rhonchi or rales.   S1 normal, faint S2 click auscultated; no murmurs, regular rate and rhythm.   Abdomen soft without tenderness, guarding, mass or organomegaly.   Extremities show no edema, normal peripheral pulses.   Neurological is normal, no focal findings.    BREAST EXAM: left breast normal without mass, skin or nipple changes or axillary nodes, 2 cm x 1 cm, mobile, nontender oval mass palpable in RUQ, 2 mm mobile, nontender round mass palpable in R breast near sternal border at 2 o'clock    PELVIC EXAM:   VULVA: normal appearing vulva with no masses, " tenderness or lesions,   VAGINA: normal appearing vagina with normal color and discharge, no lesions, atrophic,  CERVIX: normal appearing cervix without discharge or lesions, deviated to L,   UTERUS: enlarged to 8 week's size, fibroid palpable along R border,   ADNEXA: no masses,   RECTAL: normal rectal, no masses    ASSESSMENT:   1. Well woman exam  Ambulatory referral/consult to Gynecology   2. Encounter for screening for cervical cancer   Liquid-Based Pap Smear, Screening    HPV High Risk Genotypes, PCR   3. Encounter for screening mammogram for breast cancer  Mammo Digital Screening Bilat w/ Fernando   4. Abnormal uterine bleeding  US Pelvis Comp with Transvag NON-OB (xpd       PLAN:   1. Well woman  --pap today  --takes 2-3 weeks for results  --mammogram ordered--following R breast mass    2. Abnormal uterine bleeding  --pelvic ultrasound ordered    3. RTC 1 year for follow up        30 minutes were spent in face to face time with this patient  90 % of this time was spent in counseling and/or coordination of care    Vashti MARTINEZ [facility]  Division of Female Pelvic Medicine and Reconstructive Surgery  Department of Obstetrics & Gynecology

## 2020-09-22 NOTE — PATIENT INSTRUCTIONS
1. Well woman  --pap today  --takes 2-3 weeks for results  --mammogram ordered    2. Abnormal uterine bleeding  --pelvic ultrasound ordered    3. RTC 1 year for follow up

## 2020-09-25 ENCOUNTER — PATIENT OUTREACH (OUTPATIENT)
Dept: INTERNAL MEDICINE | Facility: CLINIC | Age: 50
End: 2020-09-25

## 2020-09-25 LAB
HPV HR 12 DNA SPEC QL NAA+PROBE: NEGATIVE
HPV16 AG SPEC QL: NEGATIVE
HPV18 DNA SPEC QL NAA+PROBE: NEGATIVE

## 2020-09-27 ENCOUNTER — PATIENT MESSAGE (OUTPATIENT)
Dept: UROGYNECOLOGY | Facility: CLINIC | Age: 50
End: 2020-09-27

## 2020-09-28 NOTE — TELEPHONE ENCOUNTER
"Please advise,    Dr. Therese Batres!  When you examined me, you felt something 'up there' that you said you weren't worried about but should be imaged "to document it."  Y'all scheduled a pelvic ultrasound, but the ultrasound will be $533.  I can pay it but I'd rather ... buy curtains or a dress!  So what do you think?  Can I get away with not getting the ultrasound, or should I err on the side of caution?  "

## 2020-10-01 ENCOUNTER — HOSPITAL ENCOUNTER (OUTPATIENT)
Dept: RADIOLOGY | Facility: HOSPITAL | Age: 50
Discharge: HOME | End: 2020-10-01
Attending: NURSE PRACTITIONER
Payer: COMMERCIAL

## 2020-10-01 DIAGNOSIS — N93.9 ABNORMAL UTERINE BLEEDING: ICD-10-CM

## 2020-10-01 PROCEDURE — 76830 TRANSVAGINAL US NON-OB: CPT | Mod: TC

## 2020-10-01 PROCEDURE — 76830 US PELVIS COMP WITH TRANSVAG NON-OB (XPD): ICD-10-PCS | Mod: 26,,, | Performed by: RADIOLOGY

## 2020-10-01 PROCEDURE — 76856 US EXAM PELVIC COMPLETE: CPT | Mod: 26,,, | Performed by: RADIOLOGY

## 2020-10-01 PROCEDURE — 76856 US PELVIS COMP WITH TRANSVAG NON-OB (XPD): ICD-10-PCS | Mod: 26,,, | Performed by: RADIOLOGY

## 2020-10-01 PROCEDURE — 76830 TRANSVAGINAL US NON-OB: CPT | Mod: 26,,, | Performed by: RADIOLOGY

## 2020-10-06 ENCOUNTER — OFFICE VISIT (OUTPATIENT)
Dept: BEHAVIORAL HEALTH | Facility: CLINIC | Age: 50
End: 2020-10-06
Payer: COMMERCIAL

## 2020-10-06 DIAGNOSIS — F41.1 GAD (GENERALIZED ANXIETY DISORDER): Primary | ICD-10-CM

## 2020-10-06 PROCEDURE — 90839 PR PSYCHOTHERAPY FOR CRISIS; 1ST 60 MINUTES: ICD-10-PCS | Mod: 95,,, | Performed by: SOCIAL WORKER

## 2020-10-06 PROCEDURE — 90839 PSYTX CRISIS INITIAL 60 MIN: CPT | Mod: 95,,, | Performed by: SOCIAL WORKER

## 2020-10-06 NOTE — PATIENT INSTRUCTIONS
Apt made  Pt notified    Deep Breathing  Deep breathing is a simple technique thats excellent for managing emotions. Not only is deep breathing effective, its also discreet and easy to use at any time or place.  Sit comfortably and place one hand on your abdomen. Breathe in through your nose, deeply enough that the hand on your abdomen rises. Hold the air in your lungs, and then exhale slowly through your mouth, with your lips puckered as if you are blowing through a straw. The secret is to go slow: Time the inhalation (4s), pause (4s), and exhalation (6s). Practice for 3 to 5 minutes.          Progressive Muscle Relaxation  By tensing and relaxing the muscles throughout your body, you can achieve a powerful feeling of relaxation. Additionally, progressive muscle relaxation will help you spot anxiety by teaching you to recognize feelings of muscle tension.  Sit back or lie down in a comfortable position. For each area of the body listed below, you will tense your muscles tightly, but not to the point of strain. Hold the tension for 10 seconds, and pay close attention to how it feels. Then, release the tension, and notice how the feeling of relaxation differs from the feeling of tension.  Feet Curl your toes tightly into your feet, then release them.   [place] or flex your feet, then let them relax.   Thighs Squeeze your thighs together tightly, then let them relax.   Torso Suck in your abdomen, then release the tension and let it fall.   Back Squeeze your shoulder blades together, then release them.   Shoulders Lift and squeeze your shoulders toward your ears, then let them drop.   Arms Make fists and squeeze them toward your shoulders, then let them drop.   Hands Make a fist by curling your fingers into your palm, then relax your fingers.   Face Scrunch your facial features to the center of your face, then relax.   Full Body Squeeze all muscles together, then release all tension.           Imagery  Your thoughts have the power to  "change how you feel. If you think of something sad, its likely youll start to feel sad. The opposite is also true: When you think of something positive and calming, you feel relaxed. The imagery technique harnesses this power to reduce anxiety.  Think of a place that you find comforting. It could be a secluded beach, your bedroom, a quiet mountaintop, or even a loud concert. For 5 to 10 minutes, use all your senses to imagine this setting in great detail. Dont just think fleetingly about this place--really imagine it.   What do you see around you? What do you notice in the distance? Look all around to take in all your surroundings. Look for small details you would usually miss.    What sounds can you hear? Are they soft or loud? Listen closely to everything around you. Keep listening to see if you notice any distant sounds.    Are you eating or drinking something enjoyable? What is the flavor like? How does it taste? Savor all the tastes of the food or drink.    What can you feel? What is the temperature like? Think of how the air feels on your skin, and how your clothes feel on your body. Soak in all these sensations.    What scents are present? Are they strong or faint? What does the air smell like? Take some time to appreciate the scents.       GUIDED IMAGERY: YOUTUBE EXAMPLE  ALTERNATIVE: CALM HERMILA (SLEEP STORIES)     In anonymous support groups (e.g., AA or NA) they call this "stinking thinking" I call "cognitive distortions" unhelpful thinking styles because if you entertain them too long they negatively affect mood, increasing sadness or depression and/or anxiety.     Cognitive distortions are irrational thoughts that can influence your emotions. Everyone experiences cognitive distortions to some degree, but in their more extreme forms they can be harmful.  Magnification and Minimization: Exaggerating or minimizing the importance of events. One might believe their own achievements are unimportant, or that " "their mistakes are excessively important.   Catastrophizing: Seeing only the worst possible outcomes of a situation.  Overgeneralization: Making broad interpretations from a single or few events. I felt awkward during my job interview. I am always so awkward.  Magical Thinking: The belief that acts will influence unrelated situations. I am a good person--bad things shouldnt happen to me.  Personalization: The belief that one is responsible for events outside of their own control. My mom is always upset. She would be fine if I did more to help her.  Jumping to Conclusions: Interpreting the meaning of a situation with little or no evidence.  Mind Reading: Interpreting the thoughts and beliefs of others without adequate evidence. She would not go on a date with me. She probably thinks Im ugly.  Fortune Telling: The expectation that a situation will turn out badly without adequate evidence.  Emotional Reasoning: The assumption that emotions reflect the way things really are. I feel like a bad friend, therefore I must be a bad friend.  Mental Filter: You pick out a negative single issue and dwell on it, like a drop of ink that discolors a whole beaker of water; which in turns into Disqualifying the Positive: Recognizing only the negative aspects of a situation or interaction, while ignoring the positive. One might receive many compliments on an evaluation, but focus on the single piece of negative feedback.  Should Statements: The belief that things should be a certain way. Having pre-conditions on how you or other people should be. Judgmental and unforgiving expectations using musts and should create a lot of anxiety. I should always be friendly.  All-or-Nothing Thinking: Thinking in absolutes such as always, never, or every. Things are seen in black and white terms, where there are no shades of gray. If you make a mistake you think you have "failed" or are a "failure." Example: I never do a " "good enough job on anything.  Playing the Comparison Game: Comparing yourself to others and needing to keep up with or outshine others to feel good about yourself. Example: "He is so much smarter than me - I'm stupid."  Blaming- You blame yourself or others for the problems in your life, giving up control of your feelings and reactions. This is victim mentality. Example: He makes me miserable.   Labeling: You label yourself or others by terms such as "lazy," "fat," "stupid," "jerk," stating them like they are facts. A label becomes an erroneously or incorrect evaluation of self-worth. Examples: "I'm just fat and lazy" and "He's a jerk."    Write some examples of your own unhealthy thoughts and note which one or more cognitive errors fit.    For example:   Irrational thought     Types of cognitive Errors  ____________________________________________________________________________  Example: I am weird and always will be  Labeling, Fortune Telling, All or nothing       To change distorted thinking, we need to look at the facts, and change the thought to a healthy thought    For example:   Cognitive Error:   Type:   Healthy Thought    I should go to the gym. Should statement I can go to the gym.         ______________________________________________________________________________________              TRADE IN YOUR WORDS     Our thoughts often create our feelings, It is our thoughts and perceptions about events, not the events, themselves that determine our feelings and behaviors. Changing the way the we think can change feelings, and trading in your words helps you think about alternate word choices for your own self-talk. If you trade your unhealthy words that you say to yourself, you will change your thoughts to change your life.    Words and sentence to trade:    Trade "I cant stand" with "I do not care for"  Trade "I have to" with "I choose to"   Trade "I have to" with "I want to because (reward).   Trade " ""always" with "usually"  Trade "I should" with "I could"   Trade "I should" with "I can"   Trade "I must" with "I will"   Trade "I failed" with "I learned"  Trade "failure" with "challenge" or "adventure"  Trade "terrible" with "unfortunate"   Trade "never" with "infrequently" or sometimes  Trade everyone with some people  Trade Nobody with somebody  Trade Bad with challenging, difficult, or okay     You can also trade self-talk words to make negative emotions more manageable.    Trade "devasted" for "unsettled"   Trade "depression" for "sadness"   Trade "shame" for "regret"   Trade "rage" for "upset"   Trade "worried" for "concerned"    Can you think of other trade in phrases?   Write down your thoughts and change your thoughts to be healthier and reality based (based on facts)    For example:   Nobody will ever like me the way I am.    Thought Type:  Overgeneralization, catastrophizing, fortune telling/ jumping to conclusions, disqualifying the positive qualities about the self   Alternative Healthy Thought  There may be people who dont like me, but I am a good person, and I deserved to be loved and I can wait for that someone.     "

## 2020-10-06 NOTE — PROGRESS NOTES
"Individual Psychotherapy (LCSW/PhD)  Sylvia Chua,  10/6/2020  The patient location is: home ([place])  The chief complaint leading to consultation is: anxiety     Visit type: audiovisual    Face to Face time with patient: 60  75 minutes of total time spent on the encounter, which includes face to face time and non-face to face time preparing to see the patient (eg, review of tests), Obtaining and/or reviewing separately obtained history, Documenting clinical information in the electronic or other health record, Independently interpreting results (not separately reported) and communicating results to the patient/family/caregiver, or Care coordination (not separately reported).         Each patient to whom he or she provides medical services by telemedicine is:  (1) informed of the relationship between the physician and patient and the respective role of any other health care provider with respect to management of the patient; and (2) notified that he or she may decline to receive medical services by telemedicine and may withdraw from such care at any time.    Notes:     Site:  [facility]         Therapeutic Intervention: Met with patient for individual psychotherapy.    Chief complaint/reason for encounter:  anxiety     Interval history and content of current session: PT began the session by reporting she is still itching, she always forgets to take the pill (Vistaril). Although she has noticed that the pill "make me less anxious, but also keyed-up." We processed tactics to relax. PT reported she drinks 1 beer nightly, she shops, renovating the house is her "go to" coping. She believes that creates avoidance to exploring jobs, and alternative daily activities. Pt reported she is constantly worried about her dad and brother (brother had stroke last year). Pt stated, "Every time I have to do something for his medicaid and disability, I get very anxious." Pt further reported, she worries she will be " "liability for the 8,000 a month with the facility. PT readily identified triggers. Pt fears the he will end up coming home with me, taking care of Kanu (Brother). Therapist provided education on relaxation techniques. Pt reported she has done deep breathing "awhile ago." Therapist education on reality testing looking at evidence to the contrary, such as her conversation with the director, who said medicaid will pay for her brother. Pt then stated, "Even if I have to pay it, my father would be able to support Kanu" financially. Therapist provided education on cognitive distortions, unhelpful things style. PT is aware of material scanned into her AVS regarding the aforementioned to increase understanding of the concepts. She remained open and receptive to the intervention. She denies SI, no HI, or AVH.     Treatment plan:  · Target symptoms: depression, anxiety   · Why chosen therapy is appropriate versus another modality: relevant to diagnosis, patient responds to this modality, evidence based practice  · Outcome monitoring methods: self-report, observation, checklist/rating scale  · Therapeutic intervention type: insight oriented psychotherapy, behavior modifying psychotherapy, supportive psychotherapy    Risk parameters:  Patient reports no suicidal ideation  Patient reports no homicidal ideation  Patient reports no self-injurious behavior  Patient reports no violent behavior    Verbal deficits: None    Patient's response to intervention:  The patient's response to intervention is accepting, motivated.    Progress toward goals and other mental status changes:  The patient's progress toward goals is limited, first f/u since intake.     Diagnosis:     ICD-10-CM ICD-9-CM   1. MISTY (generalized anxiety disorder)  F41.1 300.02       Plan: Pt plans to continue individual psychotherapy to learn coping for anxiety and stress.     Return to clinic: as scheduled    Length of Service (minutes): 60          "

## 2020-10-07 ENCOUNTER — PATIENT MESSAGE (OUTPATIENT)
Dept: UROGYNECOLOGY | Facility: CLINIC | Age: 50
End: 2020-10-07

## 2020-10-07 LAB
FINAL PATHOLOGIC DIAGNOSIS: NORMAL
Lab: NORMAL

## 2020-10-08 ENCOUNTER — TELEPHONE (OUTPATIENT)
Dept: RADIOLOGY | Facility: HOSPITAL | Age: 50
End: 2020-10-08

## 2020-10-08 NOTE — TELEPHONE ENCOUNTER
Spoke with patient and explained mammogram findings.Patient expressed understanding of results. Patient scheduled abnormal mammogram follow up appointment at [facility] on 10/9/2020.

## 2020-10-09 ENCOUNTER — HOSPITAL ENCOUNTER (OUTPATIENT)
Dept: RADIOLOGY | Facility: HOSPITAL | Age: 50
Discharge: HOME | End: 2020-10-09
Attending: INTERNAL MEDICINE
Payer: COMMERCIAL

## 2020-10-09 ENCOUNTER — TELEPHONE (OUTPATIENT)
Dept: RADIOLOGY | Facility: HOSPITAL | Age: 50
End: 2020-10-09

## 2020-10-09 DIAGNOSIS — R92.8 ABNORMAL MAMMOGRAM: ICD-10-CM

## 2020-10-09 PROCEDURE — 77065 MAMMO DIGITAL DIAGNOSTIC RIGHT WITH TOMO: ICD-10-PCS | Mod: 26,RT,, | Performed by: RADIOLOGY

## 2020-10-09 PROCEDURE — 77061 BREAST TOMOSYNTHESIS UNI: CPT | Mod: TC,RT

## 2020-10-09 PROCEDURE — 77065 DX MAMMO INCL CAD UNI: CPT | Mod: 26,RT,, | Performed by: RADIOLOGY

## 2020-10-09 PROCEDURE — 77061 MAMMO DIGITAL DIAGNOSTIC RIGHT WITH TOMO: ICD-10-PCS | Mod: 26,RT,, | Performed by: RADIOLOGY

## 2020-10-09 PROCEDURE — 77061 BREAST TOMOSYNTHESIS UNI: CPT | Mod: 26,RT,, | Performed by: RADIOLOGY

## 2020-10-09 PROCEDURE — 77065 DX MAMMO INCL CAD UNI: CPT | Mod: TC,RT

## 2020-10-09 NOTE — TELEPHONE ENCOUNTER
Spoke with patient. Reviewed breast biopsy procedure and reviewed instructions for breast biopsy. Patient expressed understanding and all questions were answered. Provided patient with my phone number to call for any further concerns or questions.   Patient scheduled breast biopsy at the [facility] on 10/21/2020.

## 2020-10-20 ENCOUNTER — OFFICE VISIT (OUTPATIENT)
Dept: BEHAVIORAL HEALTH | Facility: CLINIC | Age: 50
End: 2020-10-20
Payer: COMMERCIAL

## 2020-10-20 DIAGNOSIS — F41.1 GAD (GENERALIZED ANXIETY DISORDER): Primary | ICD-10-CM

## 2020-10-20 PROBLEM — F41.9 ANXIETY: Status: RESOLVED | Noted: 2020-08-07 | Resolved: 2020-10-20

## 2020-10-20 PROCEDURE — 90837 PR PSYCHOTHERAPY W/PATIENT, 60 MIN: ICD-10-PCS | Mod: 95,,, | Performed by: SOCIAL WORKER

## 2020-10-20 PROCEDURE — 90837 PSYTX W PT 60 MINUTES: CPT | Mod: 95,,, | Performed by: SOCIAL WORKER

## 2020-10-20 PROCEDURE — 90785 PSYTX COMPLEX INTERACTIVE: CPT | Mod: 95,,, | Performed by: SOCIAL WORKER

## 2020-10-20 PROCEDURE — 90785 PR INTERACTIVE COMPLEXITY: ICD-10-PCS | Mod: 95,,, | Performed by: SOCIAL WORKER

## 2020-10-20 NOTE — PROGRESS NOTES
"  Individual Psychotherapy (LCSW/PhD)  Sylvia Chua,  10/20/2020  The patient location is: home ([place])  The chief complaint leading to consultation is: anxiety     Visit type: audiovisual    Face to Face time with patient: 60  75 minutes of total time spent on the encounter, which includes face to face time and non-face to face time preparing to see the patient (eg, review of tests), Obtaining and/or reviewing separately obtained history, Documenting clinical information in the electronic or other health record, Independently interpreting results (not separately reported) and communicating results to the patient/family/caregiver, or Care coordination (not separately reported).         Each patient to whom he or she provides medical services by telemedicine is:  (1) informed of the relationship between the physician and patient and the respective role of any other health care provider with respect to management of the patient; and (2) notified that he or she may decline to receive medical services by telemedicine and may withdraw from such care at any time.    Notes:     Site:  [facility]         Therapeutic Intervention: Met with patient for individual psychotherapy.    Chief complaint/reason for encounter:  anxiety     Interval history and content of current session: PT began the session by reporting she has read through the materials. Pt reported for the thinking styles she "experiences all of them." Therapist normalized the experience of cognitive distortions. Although as we went through the different thinking styles, pt was able to reality test the above statement, as there were in fact a couple she did not relate to. PT reported she experience magnification/minimization, catastrophizing, generalization, personalization, labeling, playing the comparison game, should statements. PT readily shared examples of each and we processed a recent situation that led pt to feel sad, disappointed and " "overwhelmed. PT stated, "I have been feeling wonderful, I have been feeling good about where I am. Then last night, I got the wedding pictures back, and I got unwounded by the wedding pictures." Pt stated, "I have high expectations." Pt reported she began labeling herself, "Wow I look like the 50 y.o. mother of the bride. I am just like my sister, I am just like my mother." Therapist guided pt in reality testing and processing her automatic thoughts. Pt self-disclosed that at her wedding she was getting annoyed with her mother, and she wanted her daughter to have a good experience. Pt reported her wedding pictures were also "turned out badly," as her father-in-law was the ; which she regretted.  Pt was able to recognize labeling her daughter's pictures as "bad" as a distorted reality of her daughter's wedding. As pt reported Sue wedding was "wonderful." She was able to create new healthy thoughts. Although she reported "last night" when emotions were high, but became increasingly easier today. PT reported she spoke to her daughter who agreed the photos left had room for growth, but thought they had "25 great ones. " We discussed shifting focus to the positive. Therapist further provided education on reframing strategies to change perception and mood. PT is aware of material scanned into her AVS regarding the aforementioned to increase understanding of the concepts. She remained open and receptive to the intervention. She remained open and communicative throughout the session. She denies SI, no HI, or AVH.     Treatment plan:  · Target symptoms: depression, anxiety   · Why chosen therapy is appropriate versus another modality: relevant to diagnosis, patient responds to this modality, evidence based practice  · Outcome monitoring methods: self-report, observation, checklist/rating scale  · Therapeutic intervention type: insight oriented psychotherapy, behavior modifying psychotherapy, supportive " psychotherapy    Risk parameters:  Patient reports no suicidal ideation  Patient reports no homicidal ideation  Patient reports no self-injurious behavior  Patient reports no violent behavior    Verbal deficits: None    Patient's response to intervention:  The patient's response to intervention is accepting, motivated.    Progress toward goals and other mental status changes:  The patient's progress toward goals is limited, first f/u since intake.     Diagnosis:     ICD-10-CM ICD-9-CM   1. MISTY (generalized anxiety disorder)  F41.1 300.02       Plan: Pt plans to continue individual psychotherapy to learn coping for anxiety and stress.     Return to clinic: as scheduled    Length of Service (minutes): 60

## 2020-10-20 NOTE — PATIENT INSTRUCTIONS
I decided not to post the next steps for you, I just want you to focus on actively reframing you thoughts with the Trade in Words. I am going to send you the education via [employer], as we get closer to the next session.

## 2020-10-21 ENCOUNTER — RESEARCH ENCOUNTER (OUTPATIENT)
Dept: RESEARCH | Facility: HOSPITAL | Age: 50
End: 2020-10-21

## 2020-10-21 ENCOUNTER — HOSPITAL ENCOUNTER (OUTPATIENT)
Dept: RADIOLOGY | Facility: HOSPITAL | Age: 50
Discharge: HOME | End: 2020-10-21
Attending: INTERNAL MEDICINE
Payer: COMMERCIAL

## 2020-10-21 ENCOUNTER — TELEPHONE (OUTPATIENT)
Dept: BEHAVIORAL HEALTH | Facility: CLINIC | Age: 50
End: 2020-10-21

## 2020-10-21 DIAGNOSIS — R92.8 ABNORMAL MAMMOGRAM: ICD-10-CM

## 2020-10-21 PROCEDURE — 25000003 PHARM REV CODE 250: Performed by: INTERNAL MEDICINE

## 2020-10-21 PROCEDURE — 27200939 MAMMO BREAST STEREOTACTIC BREAST BIOPSY RIGHT

## 2020-10-21 PROCEDURE — 88305 TISSUE EXAM BY PATHOLOGIST: CPT | Performed by: PATHOLOGY

## 2020-10-21 PROCEDURE — 77065 MAMMO DIGITAL DIAGNOSTIC RIGHT: ICD-10-PCS | Mod: 26,RT,, | Performed by: RADIOLOGY

## 2020-10-21 PROCEDURE — 77065 DX MAMMO INCL CAD UNI: CPT | Mod: TC,RT

## 2020-10-21 PROCEDURE — 88305 TISSUE EXAM BY PATHOLOGIST: CPT | Mod: 26,,, | Performed by: PATHOLOGY

## 2020-10-21 PROCEDURE — 88305 TISSUE EXAM BY PATHOLOGIST: ICD-10-PCS | Mod: 26,,, | Performed by: PATHOLOGY

## 2020-10-21 PROCEDURE — 19081 MAMMO BREAST STEREOTACTIC BREAST BIOPSY RIGHT: ICD-10-PCS | Mod: RT,,, | Performed by: RADIOLOGY

## 2020-10-21 PROCEDURE — 77065 DX MAMMO INCL CAD UNI: CPT | Mod: 26,RT,, | Performed by: RADIOLOGY

## 2020-10-21 PROCEDURE — 19081 BX BREAST 1ST LESION STRTCTC: CPT | Mod: RT,,, | Performed by: RADIOLOGY

## 2020-10-21 RX ORDER — LIDOCAINE HYDROCHLORIDE 10 MG/ML
5 INJECTION INFILTRATION; PERINEURAL ONCE
Status: COMPLETED | OUTPATIENT
Start: 2020-10-21 | End: 2020-10-21

## 2020-10-21 RX ORDER — LIDOCAINE HYDROCHLORIDE AND EPINEPHRINE 20; 10 MG/ML; UG/ML
20 INJECTION, SOLUTION INFILTRATION; PERINEURAL ONCE
Status: COMPLETED | OUTPATIENT
Start: 2020-10-21 | End: 2020-10-21

## 2020-10-21 RX ADMIN — LIDOCAINE HYDROCHLORIDE AND EPINEPHRINE 20 ML: 20; 10 INJECTION, SOLUTION INFILTRATION; PERINEURAL at 02:10

## 2020-10-21 RX ADMIN — LIDOCAINE HYDROCHLORIDE 3 ML: 10 INJECTION, SOLUTION EPIDURAL; INFILTRATION; INTRACAUDAL; PERINEURAL at 02:10

## 2020-10-22 LAB
FINAL PATHOLOGIC DIAGNOSIS: NORMAL
GROSS: NORMAL

## 2020-10-23 ENCOUNTER — PATIENT MESSAGE (OUTPATIENT)
Dept: RADIOLOGY | Facility: HOSPITAL | Age: 50
End: 2020-10-23

## 2020-10-26 ENCOUNTER — TELEPHONE (OUTPATIENT)
Dept: SURGERY | Facility: CLINIC | Age: 50
End: 2020-10-26

## 2020-10-26 ENCOUNTER — PATIENT OUTREACH (OUTPATIENT)
Dept: ADMINISTRATIVE | Facility: HOSPITAL | Age: 50
End: 2020-10-26

## 2020-10-26 NOTE — TELEPHONE ENCOUNTER
Patient called with results of breast biopsy from 10/21/2020, no atypia/benign calcifications, but radiologist reviewed with pt possibility of excision or close follow up. Patient has opted to see a breast surgeon to discuss options. An appointment was made for 10/28/2020 with .  reviewed biopsy results and results are benign and concordant. Patient verbalized understanding.

## 2020-10-27 ENCOUNTER — PATIENT OUTREACH (OUTPATIENT)
Dept: ADMINISTRATIVE | Facility: OTHER | Age: 50
End: 2020-10-27

## 2020-10-27 NOTE — PROGRESS NOTES
LINKS immunization registry updated  Care Everywhere updated  Health Maintenance updated  Chart reviewed for overdue Proactive [facility] Encounters ([facility]) health maintenance testing (CRS, Breast Ca, Diabetic Eye Exam)   Orders entered:N/A

## 2020-10-28 ENCOUNTER — OFFICE VISIT (OUTPATIENT)
Dept: SURGERY | Facility: CLINIC | Age: 50
End: 2020-10-28
Payer: COMMERCIAL

## 2020-10-28 VITALS
HEIGHT: 62 IN | SYSTOLIC BLOOD PRESSURE: 131 MMHG | TEMPERATURE: 97 F | BODY MASS INDEX: 19.74 KG/M2 | DIASTOLIC BLOOD PRESSURE: 61 MMHG | HEART RATE: 60 BPM | WEIGHT: 107.25 LBS

## 2020-10-28 DIAGNOSIS — R92.8 ABNORMAL MAMMOGRAM: Primary | ICD-10-CM

## 2020-10-28 PROCEDURE — 99999 PR PBB SHADOW E&M-EST. PATIENT-LVL III: ICD-10-PCS | Mod: PBBFAC,,, | Performed by: SURGERY

## 2020-10-28 PROCEDURE — 3008F PR BODY MASS INDEX (BMI) DOCUMENTED: ICD-10-PCS | Mod: CPTII,S$GLB,, | Performed by: SURGERY

## 2020-10-28 PROCEDURE — 99201 PR OFFICE/OUTPT VISIT,NEW,LEVL I: ICD-10-PCS | Mod: S$GLB,,, | Performed by: SURGERY

## 2020-10-28 PROCEDURE — 99201 PR OFFICE/OUTPT VISIT,NEW,LEVL I: CPT | Mod: S$GLB,,, | Performed by: SURGERY

## 2020-10-28 PROCEDURE — 3008F BODY MASS INDEX DOCD: CPT | Mod: CPTII,S$GLB,, | Performed by: SURGERY

## 2020-10-28 PROCEDURE — 99999 PR PBB SHADOW E&M-EST. PATIENT-LVL III: CPT | Mod: PBBFAC,,, | Performed by: SURGERY

## 2020-10-28 NOTE — LETTER
October 28, 2020      Cornelius Chan MD  [address]  [facility] [address]           [facility] entry  [address]  [facility] [address]  Phone: [phone]  Fax: [phone]          Patient: Sylvia Chua   MR Number: [id number]   YOB: 1970   Date of Visit: 10/28/2020       Dear Dr. Cornelius Chan:    Thank you for referring Syliva Chua to me for evaluation. Attached you will find relevant portions of my assessment and plan of care.    If you have questions, please do not hesitate to call me. I look forward to following Sylvia Chua along with you.    Sincerely,    Unruly Feng MD    Enclosure  CC:  No Recipients    If you would like to receive this communication electronically, please contact [email or web address] or [phone] to request more information on [employer] access.    For providers and/or their staff who would like to refer a patient to [facility], please contact us through our one-stop-shop provider referral line, [facility], [place], at [phone].    If you feel you have received this communication in error or would no longer like to receive these types of communications, please e-mail [email or web address]

## 2020-10-30 ENCOUNTER — PATIENT MESSAGE (OUTPATIENT)
Dept: RADIOLOGY | Facility: HOSPITAL | Age: 50
End: 2020-10-30

## 2020-11-03 ENCOUNTER — TELEPHONE (OUTPATIENT)
Dept: BEHAVIORAL HEALTH | Facility: CLINIC | Age: 50
End: 2020-11-03

## 2020-11-09 ENCOUNTER — IMMUNIZATION (OUTPATIENT)
Dept: INTERNAL MEDICINE | Facility: CLINIC | Age: 50
End: 2020-11-09
Payer: COMMERCIAL

## 2020-11-09 ENCOUNTER — OFFICE VISIT (OUTPATIENT)
Dept: INTERNAL MEDICINE | Facility: CLINIC | Age: 50
End: 2020-11-09
Payer: COMMERCIAL

## 2020-11-09 VITALS
OXYGEN SATURATION: 97 % | BODY MASS INDEX: 19.84 KG/M2 | HEART RATE: 55 BPM | WEIGHT: 107.81 LBS | DIASTOLIC BLOOD PRESSURE: 70 MMHG | HEIGHT: 62 IN | SYSTOLIC BLOOD PRESSURE: 116 MMHG

## 2020-11-09 DIAGNOSIS — F41.1 GAD (GENERALIZED ANXIETY DISORDER): Primary | ICD-10-CM

## 2020-11-09 DIAGNOSIS — N93.9 ABNORMAL UTERINE BLEEDING (AUB): ICD-10-CM

## 2020-11-09 DIAGNOSIS — Z20.822 CLOSE EXPOSURE TO COVID-19 VIRUS: ICD-10-CM

## 2020-11-09 DIAGNOSIS — Z23 IMMUNIZATION DUE: ICD-10-CM

## 2020-11-09 DIAGNOSIS — L81.4 SOLAR LENTIGINOSIS: ICD-10-CM

## 2020-11-09 PROCEDURE — 99215 PR OFFICE/OUTPT VISIT, EST, LEVL V, 40-54 MIN: ICD-10-PCS | Mod: S$GLB,,, | Performed by: INTERNAL MEDICINE

## 2020-11-09 PROCEDURE — 90686 IIV4 VACC NO PRSV 0.5 ML IM: CPT | Mod: S$GLB,,, | Performed by: INTERNAL MEDICINE

## 2020-11-09 PROCEDURE — 99999 PR PBB SHADOW E&M-EST. PATIENT-LVL III: ICD-10-PCS | Mod: PBBFAC,,, | Performed by: INTERNAL MEDICINE

## 2020-11-09 PROCEDURE — 3008F PR BODY MASS INDEX (BMI) DOCUMENTED: ICD-10-PCS | Mod: CPTII,S$GLB,, | Performed by: INTERNAL MEDICINE

## 2020-11-09 PROCEDURE — 90471 IMMUNIZATION ADMIN: CPT | Mod: S$GLB,,, | Performed by: INTERNAL MEDICINE

## 2020-11-09 PROCEDURE — 99999 PR PBB SHADOW E&M-EST. PATIENT-LVL III: CPT | Mod: PBBFAC,,, | Performed by: INTERNAL MEDICINE

## 2020-11-09 PROCEDURE — 90686 FLU VACCINE (QUAD) GREATER THAN OR EQUAL TO 3YO PRESERVATIVE FREE IM: ICD-10-PCS | Mod: S$GLB,,, | Performed by: INTERNAL MEDICINE

## 2020-11-09 PROCEDURE — 99215 OFFICE O/P EST HI 40 MIN: CPT | Mod: S$GLB,,, | Performed by: INTERNAL MEDICINE

## 2020-11-09 PROCEDURE — 3008F BODY MASS INDEX DOCD: CPT | Mod: CPTII,S$GLB,, | Performed by: INTERNAL MEDICINE

## 2020-11-09 PROCEDURE — 90471 FLU VACCINE (QUAD) GREATER THAN OR EQUAL TO 3YO PRESERVATIVE FREE IM: ICD-10-PCS | Mod: S$GLB,,, | Performed by: INTERNAL MEDICINE

## 2020-11-09 RX ORDER — TRETINOIN 0.1 MG/G
GEL TOPICAL NIGHTLY
Qty: 56 G | Refills: 1 | Status: SHIPPED | OUTPATIENT
Start: 2020-11-09 | End: 2022-02-07

## 2020-11-09 NOTE — PROGRESS NOTES
"    CHIEF COMPLAINT     Chief Complaint   Patient presents with    Follow-up       HPI     Sylvia Chua is a 50 y.o. female here today for     Abnormal mammogram  Since last visit patient has undergone mammogram and breast biopsy.  Per chart review,  Pathology benign and patient followed up with breast surgeon who recommended repeating mammogram in 6 months.    Also seen by her gyn  For Pap and abnormal uterine bleeding.  Pap smear was normal.  Pelvic ultrasound showed an endometrial stripe 10 mm.   Patient is perimenopausal fluids consider up from a normal    Anxiety  Has been in therapy.  This has been helpful.  Reports going through a stressful.  Where her father tested positive for COVID for she sat isolation within for 3 weeks.    Personally Reviewed Patient's Medical, surgical, family and social hx. Changes updated in Ver[employer].  Care Team updated in Epic    Review of Systems:  Review of Systems   Constitutional: Negative for fever.   Respiratory: Negative for cough and shortness of breath.    Cardiovascular: Negative for palpitations.   Skin:        Sun spots on face   Psychiatric/Behavioral: The patient is nervous/anxious.    All other systems reviewed and are negative.      Health Maintenance:   Reviewed with patient  Due for the following:      PHYSICAL EXAM     /70 (BP Location: Left arm, Patient Position: Sitting, BP Method: Medium (Manual))   Pulse (!) 55   Ht 5' 2" (1.575 m)   Wt 48.9 kg (107 lb 12.9 oz)   SpO2 97%   BMI 19.72 kg/m²     Gen: Well Appearing, NAD  HEENT: PERR, EOMI  Neck: FROM, no thyromegaly, no cervical adenopathy  CVD: RRR, no M/R/G  Pulm: Normal work of breathing, CTAB, no wheezing  Abd:  Soft, NT, ND non TTP, no mass  MSK: no LE edema  Neuro: A&Ox3, gait normal, speech normal  Mood; Mood normal, behavior normal, thought process linear   Skin:  Hyper pigmented macules on face consistent with age-related some spots    LABS     Labs reviewed; Notable for  Lab Results "   Component Value Date    CREATININE 0.8 08/07/2020    BUN 9 08/07/2020     08/07/2020    K 4.2 08/07/2020     08/07/2020    CO2 28 08/07/2020     Pelvic ultrasound:  Endometrial stripe 10 mm, 0 was normal    Breast bx 10/21/20  - Benign breast tissue with fibrocystic changes, including: stromal fibrosis,   microcyst formation, and fibroadenomatoid changes   ASSESSMENT     1. MISTY (generalized anxiety disorder)     2. Abnormal uterine bleeding (AUB)     3. Close exposure to COVID-19 virus  COVID-19 (SARS CoV-2) IgG Antibody   4. Immunization due     5. Solar lentiginosis  tretinoin (RETIN-A) 0.01 % gel           Plan     Sylvia Chua is a 50 y.o. female with  1. MISTY (generalized anxiety disorder)  Healthcare anxiety  Discussed multimodal tx options including lifestyle optimization, talk therapy and pharmacotherapy.  Lifestyle: increase fruit and vegetable intake, improve sleep hygiene with goal  8 hours of sleep and increase physical activity with goal of 150 minutes weekly   Talk therapy:  Continues been helpful  Medication:  None indicated at this time    2. Abnormal uterine bleeding (AUB)  Patient is perimenopausal .  Ultrasound normal  Managed by gyn    3. Close exposure to COVID-19 virus  Patient was in close contact with her father for 3 weeks when he was in isolation for COVID.  Reports having to negative test done wall there.  However reports couple episodes of fever while caring for him.  Discussed getting antibody to see if she previously had.  Will check to see if it is covered by her insurance.  - COVID-19 (SARS CoV-2) IgG Antibody; Future    4. Immunization due  Flu shot today    5. Solar lentiginosis  Sun related skin spots of aging  Will try topical retinoid  - tretinoin (RETIN-A) 0.01 % gel; Apply topically every evening.  Dispense: 56 g; Refill: 1      Cornelius Chan MD

## 2020-11-10 ENCOUNTER — PATIENT MESSAGE (OUTPATIENT)
Dept: BEHAVIORAL HEALTH | Facility: CLINIC | Age: 50
End: 2020-11-10

## 2020-11-17 ENCOUNTER — OFFICE VISIT (OUTPATIENT)
Dept: BEHAVIORAL HEALTH | Facility: CLINIC | Age: 50
End: 2020-11-17
Payer: COMMERCIAL

## 2020-11-17 DIAGNOSIS — F41.1 GAD (GENERALIZED ANXIETY DISORDER): Primary | ICD-10-CM

## 2020-11-17 PROCEDURE — 90837 PSYTX W PT 60 MINUTES: CPT | Mod: 95,,, | Performed by: SOCIAL WORKER

## 2020-11-17 PROCEDURE — 90837 PR PSYCHOTHERAPY W/PATIENT, 60 MIN: ICD-10-PCS | Mod: 95,,, | Performed by: SOCIAL WORKER

## 2020-11-17 NOTE — PROGRESS NOTES
"  Individual Psychotherapy (LCSW/PhD)  Sylvia Chua,  11/17/2020  The patient location is: home ([place])  The chief complaint leading to consultation is: anxiety     Visit type: audiovisual    Face to Face time with patient: 60  75 minutes of total time spent on the encounter, which includes face to face time and non-face to face time preparing to see the patient (eg, review of tests), Obtaining and/or reviewing separately obtained history, Documenting clinical information in the electronic or other health record, Independently interpreting results (not separately reported) and communicating results to the patient/family/caregiver, or Care coordination (not separately reported).         Each patient to whom he or she provides medical services by telemedicine is:  (1) informed of the relationship between the physician and patient and the respective role of any other health care provider with respect to management of the patient; and (2) notified that he or she may decline to receive medical services by telemedicine and may withdraw from such care at any time.    Notes:     Site:  [facility]         Therapeutic Intervention: Met with patient for individual psychotherapy.    Chief complaint/reason for encounter:  anxiety     Interval history and content of current session: PT began the session by reporting she has read through the materials.   Feels like she crammed for the appt. Pt reported she is feeling better, "doing better over the last past couple weeks." She feels more even keel. Her father fell, Ordinarily she would be upset that "I had failed daddy... and I wasn't." Setting limits, "I will go and fix the phone tomorrow." Her appt is coming together, the floors are finished. We have "enough" money. She feels relieves that her father has a team of professionals to support him. She continues to report labeling. Therapist educated pt on positive affirmations. Pt reported the running has helped " with her anxiety. Pt reported it makes her feel healthy. She remained open and communicative throughout the session. She denies SI, no HI, or AVH.     Treatment plan:  · Target symptoms: depression, anxiety   · Why chosen therapy is appropriate versus another modality: relevant to diagnosis, patient responds to this modality, evidence based practice  · Outcome monitoring methods: self-report, observation, checklist/rating scale  · Therapeutic intervention type: insight oriented psychotherapy, behavior modifying psychotherapy, supportive psychotherapy    Risk parameters:  Patient reports no suicidal ideation  Patient reports no homicidal ideation  Patient reports no self-injurious behavior  Patient reports no violent behavior    Verbal deficits: None    Patient's response to intervention:  The patient's response to intervention is accepting, motivated.    Progress toward goals and other mental status changes:  The patient's progress toward goals is good     Diagnosis:     ICD-10-CM ICD-9-CM   1. MISTY (generalized anxiety disorder)  F41.1 300.02       Plan: Pt plans to continue individual psychotherapy to learn coping for anxiety and stress.     Return to clinic: as scheduled    Length of Service (minutes): 60

## 2020-11-17 NOTE — PATIENT INSTRUCTIONS
Top 100 List of Positive Affirmations   For the full, detailed guide, please visit:   [email or web address] Positive Affirmation  Category    Today, and every day, I choose to be confident  Confidence    I radiate confidence, certainty and optimism  Confidence    I courageously open and move through every door of opportunity  Confidence    I am in charge of my life  Confidence    I have the power to live my dreams  Confidence    My mind has unlimited power  Confidence    I stand up for what I belief in  Confidence    I act with courage and confidence  Confidence    I believe in myself  Confidence    I am creative and think outside the box  Entrepreneurship    I attract success and prosperity with all of my ideas  Entrepreneurship    I wake up and make a difference in this world  Entrepreneurship    My business is growing every single day  Entrepreneurship    I am building something that is greater than myself  Entrepreneurship    I am fulfilling my purpose in life  Entrepreneurship    I forgive myself  Forgiveness    What I did is in the past and now I can create my future  Forgiveness    I forgive everyone that has hurt me in the past and move forward with a cleansed soul  Forgiveness    I forgive those who have harmed me in my past and peacefully detach from them  Forgiveness    I am rich in health, wealth and love  General    Opportunities and advantages come with each door that I open  General    The more I give to the world, the more I get  General    What I currently do is serving me towards my higher purpose  General    I have the power to change my thoughts in a second  General    I'm allowed to do what I want with my life  General    I have the power to change myself  General    I allow myself to play and enjoy life  General    I am making a difference in this world  General    I am at peace with all that has happened, is happening, and will happen  General    My life is just  beginning  General    Today, I abandon my old habits and take up new, more positive ones  Breaking Bad Habits    I've given up my bad habits and I'm so grateful for that  Breaking Bad Habits    I am now free from my bad habits  Breaking Bad Habits    I only do positive habits  Breaking Bad Habits    I am thankful that I get to live another day  Gratitude    I see the world with beauty and colour  Gratitude    I deserve whatever good comes my way today  Gratitude    Today is rich with opportunity and I open my heart to receive them  Gratitude    I take the time to show my friends that I care about them  Gratitude    I live a positive life and only attract the best in my life  Happiness    I am peacefully allowing my life to unfold  Happiness    Today, and every day, I choose to be happy  Happiness    I am fun and energetic and people love me for it  Happiness    My life overflows with happiness and love  Happiness    I always have everything I need to be happy  Happiness    I fuel my mind with healthy thoughts  Health    I fuel my body with healthy foods  Health    I eat foods that energise and sustain me  Health    I fuel my mind and body with exercise  Health    I feel every cell in my body get healthier every day  Health    Every day my mind and body are becoming more healthy and energetic  Health    I think, act and communicate like a leader  Leadership    I am an inspirational leader  Leadership    I am a role-model for others  Leadership    I inspire others to be their best self  Leadership    I lead by example  Leadership    I am an effective communicator  Leadership    I give my love to the world and the world sends me love in return  Love    Today, and every day, I choose to give to the world  Love    Today, and every day, I choose to make a difference in this world  Love    Everywhere I look I see love  Love    The partner I seek is also seeking me  Love    I love my partner with all my heart  Love    I am  thankful that I get to share this beautiful life with my partner  Love    I surround myself with positive and loving people  Love    Today I could meet the love of my life  Love    I am ready to be in love  Love    I love myself more every day  Love    I am blessed with an incredible family and wonderful friends  Love    I always have enough money to suit my needs  Money    Money flows to me like a beautiful gold river  Money    I love watching my money grow  Money    I am full of money-making ideas  Money    My income is continuously increasing  Money    I am generous with money as I know it will return in magnitude  Money    Today, I claim my share  Money    Money flows freely and abundantly into my life  Money    I deserve to be rich  Money    I love facing challenges - they allow me to grow  Overcoming Challenges    There is a benefit and an opportunity in every experience I have  Overcoming Challenges    My attitude grows happier and healthier every single day  Overcoming Challenges    I am always in the right place at the right time  Overcoming Challenges    I have everything I need to overcome this challenge  Overcoming Challenges    I am a better person due to the challenges I've faced  Overcoming Challenges    I learn and grow from every experience  Overcoming Challenges    Everything that is happening now is happening for my ultimate good  Overcoming Challenges    I am breaking old habits and creating new successful ones  Productivity    I become more productive every single day  Productivity    I have unwavering discipline and because of this I will succeed  Productivity    I always win because I am willing to work harder than anyone else  Productivity    I will die before I give up  Productivity    Time is the most valuable resource, therefore I spend it wisely  Productivity    I am disciplined and productive in everything that I do  Productivity    I am the most beautiful person I know  Self-worth    I  have a heart of gold and share this with the world  Self-worth    I have the power, right now, to decide what I want to do  Self-worth    I am a gift to the world  Self-worth    I am unique and have so much to offer this world  Self-worth    I am the definition of sexy  Self-worth    I have the power to say yes and say no  Success    I choose to do what matters most to me everyday  Success    Today, and every day, I choose to be successful  Success    I make choices based on inspiration and not desperation  Success    I am a magnet for success and good fortune  Success    Today, I am stronger and wiser than I was yesterday  Success    I am a genius and I apply my wisdom everyday  Success    I am a magnet for other like-minded and successful people  Success    Every day, in every way, I am becoming more successful  Success    [place] and success is my natural state of mind  Success    I am an example of success and triumph  Success    I demonstrate excellence in everything I do  Success    My life is an adventure filled with opportunity and reward  Success    I am committed to my goals  Success    I bring solutions  Success    Today, and every day, I am moving a step closer to my goals  Success    I am focused on and moving towards my higher purpose  Success    I do meaningful work that positively impacts this world  Success    I am open to opportunities  Success    I am the definition of success  Success    I deserve to be successful  Success

## 2020-12-09 ENCOUNTER — TELEPHONE (OUTPATIENT)
Dept: BEHAVIORAL HEALTH | Facility: CLINIC | Age: 50
End: 2020-12-09

## 2020-12-09 NOTE — PROGRESS NOTES
Behavioral Health Community Health Worker  Follow-Up  Completed by:  Terence Shah    Date:  12/9/2020    Patient Enrollment in Behavioral Health Program:  · Sylvia Chua was enrolled in the Behavioral Health Program on September 16,2020    Assessments     Promis 10:  PROMIS-10 8/18/2020   In general, would you say your health is 4   In general, would you say your quality of life is 4   In general, how would you rate your physical health? 4   In general, how would you rate your mental health, including your mood and your ability to think? 2   In general, how would you rate your satisfaction with your social activities and relationships? 2   In general, please rate how well you carry out your usual social activities and roles. 5   To what extent are you able to carry out your everyday physical activities such as walking, climbing stairs, carrying groceries, or moving a chair?  5   In the past 7 days, how often have you been bothered by emotional problems such as feeling anxious, depressed or irritable? 5   In the past 7 days, how would you rate your fatigue on average? 4   In the past 7 days, on a scale of 0 to 10 (where 0 is no pain and 10 is the worst pain imaginable) how would you rate your pain on average? 2   Global Physical Health 15   Global Mental health Score 13       Depression PHQ:  PHQ9 12/9/2020   Total Score 4       Generalized Anxiety Disorder 7-Item Scale:  GAD7 12/9/2020   1. Feeling nervous, anxious, or on edge? 1   2. Not being able to stop or control worrying? 1   3. Worrying too much about different things? 1   4. Trouble relaxing? 1   5. Being so restless that it is hard to sit still? 1   6. Becoming easily annoyed or irritable? 0   7. Feeling afraid as if something awful might happen? 1   8. If you checked off any problems, how difficult have these problems made it for you to do your work, take care of things at home, or get along with other people? 0   MISTY-7 Score 6       Patients'  Global Impression of Change (PGIC) Scale:  Since beginning treatment at this clinic, how would you describe the change (if any) in ACTIVITY LIMITATIONS, SYMPTOMS, EMOTIONS, and OVERALL QUALITY OF LIFE, related to your painful condition?  No Value exists for the : OHS#54717      In a similar way, please check the number below that matches your degree of change since beginning care at this clinic (Much better (0) - Much Worse (10)): No Value exists for the : OHS#[id number]        Much Better                                     No Change                                    Much Worse                        -----------------------------------------------------------------------------                        0       1       2       3       4       5       6       7      8       9      10                     Call Summary     CHW reach out to Sylvia Chua to complete her follow up assessment on December 9,2020. Sylvia Chua scored a (4) on the PHQ-9 and a (6) on the MISTY-7

## 2021-01-06 ENCOUNTER — PATIENT MESSAGE (OUTPATIENT)
Dept: BEHAVIORAL HEALTH | Facility: CLINIC | Age: 51
End: 2021-01-06

## 2021-02-24 ENCOUNTER — TELEPHONE (OUTPATIENT)
Dept: BEHAVIORAL HEALTH | Facility: CLINIC | Age: 51
End: 2021-02-24

## 2021-04-16 ENCOUNTER — PATIENT MESSAGE (OUTPATIENT)
Dept: RESEARCH | Facility: HOSPITAL | Age: 51
End: 2021-04-16

## 2021-05-31 ENCOUNTER — IMMUNIZATION (OUTPATIENT)
Dept: PHARMACY | Facility: CLINIC | Age: 51
End: 2021-05-31
Payer: COMMERCIAL

## 2021-05-31 DIAGNOSIS — Z23 NEED FOR VACCINATION: Primary | ICD-10-CM

## 2021-06-28 ENCOUNTER — IMMUNIZATION (OUTPATIENT)
Dept: PHARMACY | Facility: CLINIC | Age: 51
End: 2021-06-28
Payer: COMMERCIAL

## 2021-06-28 DIAGNOSIS — Z23 NEED FOR VACCINATION: Primary | ICD-10-CM

## 2021-10-29 ENCOUNTER — HOSPITAL ENCOUNTER (OUTPATIENT)
Dept: RADIOLOGY | Facility: HOSPITAL | Age: 51
Discharge: HOME | End: 2021-10-29
Attending: NURSE PRACTITIONER
Payer: COMMERCIAL

## 2021-10-29 VITALS — BODY MASS INDEX: 19.69 KG/M2 | WEIGHT: 107 LBS | HEIGHT: 62 IN

## 2021-10-29 DIAGNOSIS — Z12.31 ENCOUNTER FOR SCREENING MAMMOGRAM FOR BREAST CANCER: ICD-10-CM

## 2021-10-29 PROCEDURE — 77067 SCR MAMMO BI INCL CAD: CPT | Mod: TC

## 2021-10-29 PROCEDURE — 77063 MAMMO DIGITAL SCREENING BILAT WITH TOMO: ICD-10-PCS | Mod: 26,,, | Performed by: RADIOLOGY

## 2021-10-29 PROCEDURE — 77067 SCR MAMMO BI INCL CAD: CPT | Mod: 26,,, | Performed by: RADIOLOGY

## 2021-10-29 PROCEDURE — 77067 MAMMO DIGITAL SCREENING BILAT WITH TOMO: ICD-10-PCS | Mod: 26,,, | Performed by: RADIOLOGY

## 2021-10-29 PROCEDURE — 77063 BREAST TOMOSYNTHESIS BI: CPT | Mod: 26,,, | Performed by: RADIOLOGY

## 2021-11-01 ENCOUNTER — PATIENT MESSAGE (OUTPATIENT)
Dept: UROGYNECOLOGY | Facility: CLINIC | Age: 51
End: 2021-11-01
Payer: COMMERCIAL

## 2022-01-19 ENCOUNTER — PATIENT MESSAGE (OUTPATIENT)
Dept: ADMINISTRATIVE | Facility: HOSPITAL | Age: 52
End: 2022-01-19
Payer: COMMERCIAL

## 2022-02-07 ENCOUNTER — OFFICE VISIT (OUTPATIENT)
Dept: INTERNAL MEDICINE | Facility: CLINIC | Age: 52
End: 2022-02-07
Payer: COMMERCIAL

## 2022-02-07 ENCOUNTER — IMMUNIZATION (OUTPATIENT)
Dept: PHARMACY | Facility: CLINIC | Age: 52
End: 2022-02-07
Payer: COMMERCIAL

## 2022-02-07 VITALS
SYSTOLIC BLOOD PRESSURE: 106 MMHG | HEIGHT: 62 IN | DIASTOLIC BLOOD PRESSURE: 60 MMHG | HEART RATE: 65 BPM | BODY MASS INDEX: 18.06 KG/M2 | WEIGHT: 98.13 LBS

## 2022-02-07 DIAGNOSIS — Z00.00 ANNUAL PHYSICAL EXAM: Primary | ICD-10-CM

## 2022-02-07 DIAGNOSIS — G57.01 PIRIFORMIS SYNDROME OF RIGHT SIDE: ICD-10-CM

## 2022-02-07 DIAGNOSIS — Z12.11 SCREENING FOR COLON CANCER: ICD-10-CM

## 2022-02-07 DIAGNOSIS — R10.13 DYSPEPSIA: ICD-10-CM

## 2022-02-07 DIAGNOSIS — M75.41 IMPINGEMENT SYNDROME OF RIGHT SHOULDER: ICD-10-CM

## 2022-02-07 DIAGNOSIS — Z23 NEED FOR VACCINATION: Primary | ICD-10-CM

## 2022-02-07 PROCEDURE — 3008F PR BODY MASS INDEX (BMI) DOCUMENTED: ICD-10-PCS | Mod: CPTII,S$GLB,, | Performed by: INTERNAL MEDICINE

## 2022-02-07 PROCEDURE — 3074F SYST BP LT 130 MM HG: CPT | Mod: CPTII,S$GLB,, | Performed by: INTERNAL MEDICINE

## 2022-02-07 PROCEDURE — 3074F PR MOST RECENT SYSTOLIC BLOOD PRESSURE < 130 MM HG: ICD-10-PCS | Mod: CPTII,S$GLB,, | Performed by: INTERNAL MEDICINE

## 2022-02-07 PROCEDURE — 3078F DIAST BP <80 MM HG: CPT | Mod: CPTII,S$GLB,, | Performed by: INTERNAL MEDICINE

## 2022-02-07 PROCEDURE — 99396 PREV VISIT EST AGE 40-64: CPT | Mod: S$GLB,,, | Performed by: INTERNAL MEDICINE

## 2022-02-07 PROCEDURE — 1160F RVW MEDS BY RX/DR IN RCRD: CPT | Mod: CPTII,S$GLB,, | Performed by: INTERNAL MEDICINE

## 2022-02-07 PROCEDURE — 99999 PR PBB SHADOW E&M-EST. PATIENT-LVL IV: CPT | Mod: PBBFAC,,, | Performed by: INTERNAL MEDICINE

## 2022-02-07 PROCEDURE — 3078F PR MOST RECENT DIASTOLIC BLOOD PRESSURE < 80 MM HG: ICD-10-PCS | Mod: CPTII,S$GLB,, | Performed by: INTERNAL MEDICINE

## 2022-02-07 PROCEDURE — 99396 PR PREVENTIVE VISIT,EST,40-64: ICD-10-PCS | Mod: S$GLB,,, | Performed by: INTERNAL MEDICINE

## 2022-02-07 PROCEDURE — 1160F PR REVIEW ALL MEDS BY PRESCRIBER/CLIN PHARMACIST DOCUMENTED: ICD-10-PCS | Mod: CPTII,S$GLB,, | Performed by: INTERNAL MEDICINE

## 2022-02-07 PROCEDURE — 1159F PR MEDICATION LIST DOCUMENTED IN MEDICAL RECORD: ICD-10-PCS | Mod: CPTII,S$GLB,, | Performed by: INTERNAL MEDICINE

## 2022-02-07 PROCEDURE — 3008F BODY MASS INDEX DOCD: CPT | Mod: CPTII,S$GLB,, | Performed by: INTERNAL MEDICINE

## 2022-02-07 PROCEDURE — 1159F MED LIST DOCD IN RCRD: CPT | Mod: CPTII,S$GLB,, | Performed by: INTERNAL MEDICINE

## 2022-02-07 PROCEDURE — 99999 PR PBB SHADOW E&M-EST. PATIENT-LVL IV: ICD-10-PCS | Mod: PBBFAC,,, | Performed by: INTERNAL MEDICINE

## 2022-02-07 NOTE — PROGRESS NOTES
"    CHIEF COMPLAINT     Chief Complaint   Patient presents with    Annual Exam       HPI     Sylvia Chua is a 51 y.o. female here today for     +radiculopathy BL Sitting.  Reports this has gradually got worse for the past year.  She notices wheezing long car trips.  Reports symptoms are bothersome but biggest concern is that what is causing them.      Right shoulder pain   she has been gardening at the [place] and has noticed her right shoulder hurts.  Worse when lifting things twisting.  Has not found anything that makes symptoms better.  Does not remember any acute inciting event onset was insidious.      Personally Reviewed Patient's Medical, surgical, family and social hx. Changes updated in Droplr.  Care Team updated in Epic    Review of Systems:  Review of Systems   Constitutional: Negative for activity change and unexpected weight change.   HENT: Negative for hearing loss, rhinorrhea and trouble swallowing.    Eyes: Negative for discharge and visual disturbance.   Respiratory: Negative for chest tightness and wheezing.    Cardiovascular: Negative for chest pain and palpitations.   Gastrointestinal: Negative for blood in stool, constipation, diarrhea and vomiting.   Endocrine: Negative for polydipsia and polyuria.   Genitourinary: Negative for difficulty urinating, dysuria, hematuria and menstrual problem.   Musculoskeletal: Positive for arthralgias and joint swelling. Negative for neck pain.   Neurological: Negative for weakness and headaches.   Psychiatric/Behavioral: Negative for confusion and dysphoric mood.       Health Maintenance:   Reviewed with patient  Due for the following:    Flu shot du  E COVID booster due   colonoscopy due    PHYSICAL EXAM     /60 (BP Location: Right arm, Patient Position: Sitting, BP Method: Medium (Manual))   Pulse 65   Ht 5' 2" (1.575 m)   Wt 44.5 kg (98 lb 1.7 oz)   BMI 17.94 kg/m²     Gen: Well Appearing, NAD  HEENT: PERR, EOMI  Neck: FROM, no thyromegaly, " no cervical adenopathy  CVD: RRR, no M/R/G  Pulm: Normal work of breathing, CTAB, no wheezing  Abd:  Soft, NT, ND non TTP, no mass  MSK: no LE edema   tender palpation left greater troch negative straight leg raise bilaterally no L-spine tenderness no weakness normal reflexes bilateral lower extremity   right shoulder positive job negative belly press positive active AB duction to 90° no pain with abduction greater than 90° less pain with passive abduction.  Pain with resisted internal rotation no pain with resisted external rotation  Neuro: A&Ox3, gait normal, speech normal  Mood; Mood normal, behavior normal, thought process linear       LABS     Labs reviewed; Notable for    ASSESSMENT     1. Annual physical exam  CBC Auto Differential    Comprehensive Metabolic Panel    Hemoglobin A1C    Lipid Panel    Ferritin    TSH   2. Screening for colon cancer  Case Request Endoscopy: COLONOSCOPY   3. Dyspepsia  H. pylori antigen, stool   4. Piriformis syndrome of right side  Ambulatory referral/consult to Physical/Occupational Therapy   5. Impingement syndrome of right shoulder             Plan     Sylvia Chua is a 51 y.o. female with  1. Annual physical exam  Updated problem list, medical history, care team and discussed HM.     - CBC Auto Differential; Future  - Comprehensive Metabolic Panel; Future  - Hemoglobin A1C; Future  - Lipid Panel; Future  - Ferritin; Future  - TSH; Future    2. Screening for colon cancer  - Case Request Endoscopy: COLONOSCOPY    3. Dyspepsia  Recommend 2 week  Pepcid holiday   discussed [employer] smart phone gwyn  - H. pylori antigen, stool; Future    4. Piriformis syndrome of right side   symptoms consistent with pseudo sciatica.  Will send to physical therapy will get imaging of back if symptoms do not improve  - Ambulatory referral/consult to Physical/Occupational Therapy; Future    5. Impingement syndrome of right shoulder   home exercise program given    Cornelius Chan MD

## 2022-02-08 ENCOUNTER — LAB VISIT (OUTPATIENT)
Dept: LAB | Facility: HOSPITAL | Age: 52
End: 2022-02-08
Attending: INTERNAL MEDICINE
Payer: COMMERCIAL

## 2022-02-08 DIAGNOSIS — Z00.00 ANNUAL PHYSICAL EXAM: ICD-10-CM

## 2022-02-08 LAB
ALBUMIN SERPL BCP-MCNC: 4 G/DL (ref 3.5–5.2)
ALP SERPL-CCNC: 57 U/L (ref 55–135)
ALT SERPL W/O P-5'-P-CCNC: 16 U/L (ref 10–44)
ANION GAP SERPL CALC-SCNC: 9 MMOL/L (ref 8–16)
AST SERPL-CCNC: 20 U/L (ref 10–40)
BASOPHILS # BLD AUTO: 0.06 K/UL (ref 0–0.2)
BASOPHILS NFR BLD: 0.7 % (ref 0–1.9)
BILIRUB SERPL-MCNC: 0.8 MG/DL (ref 0.1–1)
BUN SERPL-MCNC: 15 MG/DL (ref 6–20)
CALCIUM SERPL-MCNC: 9.9 MG/DL (ref 8.7–10.5)
CHLORIDE SERPL-SCNC: 104 MMOL/L (ref 95–110)
CHOLEST SERPL-MCNC: 195 MG/DL (ref 120–199)
CHOLEST/HDLC SERPL: 3.3 {RATIO} (ref 2–5)
CO2 SERPL-SCNC: 27 MMOL/L (ref 23–29)
CREAT SERPL-MCNC: 0.8 MG/DL (ref 0.5–1.4)
DIFFERENTIAL METHOD: ABNORMAL
EOSINOPHIL # BLD AUTO: 0.2 K/UL (ref 0–0.5)
EOSINOPHIL NFR BLD: 1.9 % (ref 0–8)
ERYTHROCYTE [DISTWIDTH] IN BLOOD BY AUTOMATED COUNT: 13.1 % (ref 11.5–14.5)
EST. GFR  (AFRICAN AMERICAN): >60 ML/MIN/1.73 M^2
EST. GFR  (NON AFRICAN AMERICAN): >60 ML/MIN/1.73 M^2
ESTIMATED AVG GLUCOSE: 100 MG/DL (ref 68–131)
FERRITIN SERPL-MCNC: 51 NG/ML (ref 20–300)
GLUCOSE SERPL-MCNC: 96 MG/DL (ref 70–110)
HBA1C MFR BLD: 5.1 % (ref 4–5.6)
HCT VFR BLD AUTO: 41.3 % (ref 37–48.5)
HDLC SERPL-MCNC: 60 MG/DL (ref 40–75)
HDLC SERPL: 30.8 % (ref 20–50)
HGB BLD-MCNC: 12.9 G/DL (ref 12–16)
IMM GRANULOCYTES # BLD AUTO: 0.01 K/UL (ref 0–0.04)
IMM GRANULOCYTES NFR BLD AUTO: 0.1 % (ref 0–0.5)
LDLC SERPL CALC-MCNC: 112.6 MG/DL (ref 63–159)
LYMPHOCYTES # BLD AUTO: 0.7 K/UL (ref 1–4.8)
LYMPHOCYTES NFR BLD: 8.1 % (ref 18–48)
MCH RBC QN AUTO: 30.9 PG (ref 27–31)
MCHC RBC AUTO-ENTMCNC: 31.2 G/DL (ref 32–36)
MCV RBC AUTO: 99 FL (ref 82–98)
MONOCYTES # BLD AUTO: 0.7 K/UL (ref 0.3–1)
MONOCYTES NFR BLD: 7.8 % (ref 4–15)
NEUTROPHILS # BLD AUTO: 7 K/UL (ref 1.8–7.7)
NEUTROPHILS NFR BLD: 81.4 % (ref 38–73)
NONHDLC SERPL-MCNC: 135 MG/DL
NRBC BLD-RTO: 0 /100 WBC
PLATELET # BLD AUTO: 198 K/UL (ref 150–450)
PMV BLD AUTO: 10.9 FL (ref 9.2–12.9)
POTASSIUM SERPL-SCNC: 4.3 MMOL/L (ref 3.5–5.1)
PROT SERPL-MCNC: 6.8 G/DL (ref 6–8.4)
RBC # BLD AUTO: 4.18 M/UL (ref 4–5.4)
SODIUM SERPL-SCNC: 140 MMOL/L (ref 136–145)
TRIGL SERPL-MCNC: 112 MG/DL (ref 30–150)
TSH SERPL DL<=0.005 MIU/L-ACNC: 2.16 UIU/ML (ref 0.4–4)
WBC # BLD AUTO: 8.56 K/UL (ref 3.9–12.7)

## 2022-02-08 PROCEDURE — 83036 HEMOGLOBIN GLYCOSYLATED A1C: CPT | Performed by: INTERNAL MEDICINE

## 2022-02-08 PROCEDURE — 82728 ASSAY OF FERRITIN: CPT | Performed by: INTERNAL MEDICINE

## 2022-02-08 PROCEDURE — 84443 ASSAY THYROID STIM HORMONE: CPT | Performed by: INTERNAL MEDICINE

## 2022-02-08 PROCEDURE — 85025 COMPLETE CBC W/AUTO DIFF WBC: CPT | Performed by: INTERNAL MEDICINE

## 2022-02-08 PROCEDURE — 80061 LIPID PANEL: CPT | Performed by: INTERNAL MEDICINE

## 2022-02-08 PROCEDURE — 80053 COMPREHEN METABOLIC PANEL: CPT | Performed by: INTERNAL MEDICINE

## 2022-02-08 PROCEDURE — 36415 COLL VENOUS BLD VENIPUNCTURE: CPT | Performed by: INTERNAL MEDICINE

## 2022-02-15 ENCOUNTER — PATIENT MESSAGE (OUTPATIENT)
Dept: ENDOSCOPY | Facility: HOSPITAL | Age: 52
End: 2022-02-15
Payer: COMMERCIAL

## 2022-02-15 ENCOUNTER — TELEPHONE (OUTPATIENT)
Dept: ENDOSCOPY | Facility: HOSPITAL | Age: 52
End: 2022-02-15
Payer: COMMERCIAL

## 2022-02-15 DIAGNOSIS — Z12.11 SPECIAL SCREENING FOR MALIGNANT NEOPLASMS, COLON: Primary | ICD-10-CM

## 2022-02-15 RX ORDER — SODIUM, POTASSIUM,MAG SULFATES 17.5-3.13G
SOLUTION, RECONSTITUTED, ORAL ORAL
Qty: 1 KIT | Refills: 0 | Status: SHIPPED | OUTPATIENT
Start: 2022-02-15 | End: 2022-05-09

## 2022-02-23 ENCOUNTER — LAB VISIT (OUTPATIENT)
Dept: LAB | Facility: HOSPITAL | Age: 52
End: 2022-02-23
Attending: INTERNAL MEDICINE
Payer: COMMERCIAL

## 2022-02-23 DIAGNOSIS — R10.13 DYSPEPSIA: ICD-10-CM

## 2022-02-23 PROCEDURE — 87338 HPYLORI STOOL AG IA: CPT | Performed by: INTERNAL MEDICINE

## 2022-02-27 LAB
H PYLORI AG STL QL IA: NORMAL
SPECIMEN SOURCE: NORMAL

## 2022-03-08 ENCOUNTER — CLINICAL SUPPORT (OUTPATIENT)
Dept: REHABILITATION | Facility: OTHER | Age: 52
End: 2022-03-08
Payer: COMMERCIAL

## 2022-03-08 DIAGNOSIS — M25.551 BILATERAL HIP PAIN: ICD-10-CM

## 2022-03-08 DIAGNOSIS — M25.511 CHRONIC RIGHT SHOULDER PAIN: ICD-10-CM

## 2022-03-08 DIAGNOSIS — G89.29 CHRONIC RIGHT SHOULDER PAIN: ICD-10-CM

## 2022-03-08 DIAGNOSIS — G57.01 PIRIFORMIS SYNDROME OF RIGHT SIDE: ICD-10-CM

## 2022-03-08 DIAGNOSIS — M62.81 MUSCLE WEAKNESS OF UPPER EXTREMITY: ICD-10-CM

## 2022-03-08 DIAGNOSIS — M25.552 BILATERAL HIP PAIN: ICD-10-CM

## 2022-03-08 PROCEDURE — 97110 THERAPEUTIC EXERCISES: CPT | Mod: PN

## 2022-03-08 PROCEDURE — 97161 PT EVAL LOW COMPLEX 20 MIN: CPT | Mod: PN

## 2022-03-08 NOTE — PLAN OF CARE
[facility]  Physical Therapy Initial Evaluation    Name: Sylvia Chua  Clinic Number: [id number]    Therapy Diagnosis:   Encounter Diagnoses   Name Primary?    Piriformis syndrome of right side     Chronic right shoulder pain     Muscle weakness of upper extremity     Bilateral hip pain      Physician: Cornelius Chan MD    Physician Orders: PT Eval and Treat   Medical Diagnosis: G57.01 (ICD-10-CM) - Piriformis syndrome of right side  Evaluation Date: 3/8/2022  Authorization Period Expiration: 2/7/2023  Plan of Care Certification Period: 5/3/2022  Visit # / Visits authorized: 1/ 1    Time In: 1:45 pm  Time Out: 2:28 pm  Total Billable Time separate from evaluation: 12 minutes    Precautions: Standard and Latex allergy      Subjective   Date of onset: 9-10 months  History of current condition - Sylvia reports: *she has pain down her legs, predominantly the R LE. Describes and tingling, nerve pain. corresponds to how heavy of a work day she has. States she started gardening a year ago. Works at [employer]. 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       Past Medical History:   Diagnosis Date    Dizziness     Migraine headache      Sylvia Chua  has a past surgical history that includes Breast biopsy (Right, 10/21/2020).    Sylvia has a current medication list which includes the following prescription(s): epinephrine and suprep bowel prep kit.    Review of patient's allergies indicates:  No Known Allergies     Imaging: no recent imaging in EPIC     Prior Therapy: none  Social History:  lives with their spouse  Occupation: works at [employer]. Gardening, sweeping  Prior Level of Function: I with amb and ADL  Current Level of Function: I with amb and ADL    Pain:  Current 2/10, worst 6/10, best 0/10   Location: bilateral buttocks  and lower legs  Description: aching, Tingling, nerve type pain. working overhead, shoveling, sweeping.  Aggravating Factors:  "driving, sitting.  Easing Factors: Tylenol    Radicular symptoms: pain into B LEs    Sleep is not disturbed. Sleeping position: stomach    Pts goals:"To stay active. No nerve pain."    Objective     Postural examination in standing:  - normal lumbar lordosis  - forward head  - forward shoulders    Postural examination in sitting:   - decreased  lumbar lordosis  - forward head  - forward shoulders      Functional assessment:   - walking: I  - sit to stand: I  - sit to supine: I       - supine to sit: I  - supine to prone:  I    UE Special Tests    Ngo-Yash Positive R UE   Neer Impingement negative   Yergason's negative   Empty Can negative       UE MMT R L   C3 Cervical side bend 5/5 5/5   C4 Shoulder Shrug 5/5  5/5   Shoulder flexion 5/5 * 5/5   Shoulder abduction 5/5 5/5   Shoulder IR 5/5 5/5   Shoulder ER 5/5 5/5   C5 Elbow flexion 5/5 5/5   C7 Elbow extension 5/5 5/5   C6 Wrist extension 5/5 5/5   Wrist flexion 5/5 5/5   Scapular protraction 5/5 5/5   Mid Traps 4-/5 4-/5   Lower traps 3+/5 3+/5     * - with pain      MMT R L   Hip flexion 5/5 5/5   Hip abduction 3+/5 3+/5   Hip extension 4/5 4/5   Hip ER 5/5 5/5   Hip IR 4/5 5/5   Knee extension 5/5 5/5   Knee flexion 5/5 5/5   Ankle dorsiflexion 5/5 5/5   Ankle plantar flexion 5/5 5/5   Ankle inversion 5/5 5/5   Ankle eversion 5/5 5/5     Flexibility testing:  - hamstrings:           R: tight, R: 30 deg. L: WNL  - gastrocnemius:     B: WNL  - piriformis:               B: tight, decreased 50%      - quadriceps:            B: WNL      - hip flexors:            B : tight  - hip adductors:       B: WNL  - IT Bands:              B: tight, decreased 50%      CMS Impairment/Limitation/Restriction for FOTO Hip Survey    Therapist reviewed FOTO scores for Sylvia Chua on 3/8/2022.   FOTO documents entered into [employer] - see Media section.    Limitation Score: 41%  Category: Mobility    Current : CK = at least 40% but < 60% impaired, limited or " "restricted  Goal: CJ = at least 20% but < 40% impaired, limited or restricted       TREATMENT   Treatment Time In: 2:11 pm  Treatment Time Out: 2:23 pm  Total Treatment time separate from Evaluation time: 12 minutes    Sylvia received therapeutic exercises to develop strength, ROM, flexibility and posture for 12 minutes including:  Piriformis stretches 3 x 30"  Sciatic nerve glides x 30 reps  scap retractions 20 x 5"      Home Exercises Provided and Patient Education Provided     Education provided:   - Discussed the role of the PTA on the Rehab Team. Discussed patient will be seen by a physical therapist minimally every 6th visit or every 30 days prior to being seen by PTA. Also discussed the use of the [facility] for communication.      Written Home Exercises Provided: yes.  Exercises were reviewed and Sylvia was able to demonstrate them prior to the end of the session.  Sylvia demonstrated good  understanding of the education provided.     See EMR under Patient Instructions for exercises provided 3/8/2022.    Assessment   Sylvia is a 51 y.o. female referred to outpatient Physical Therapy with a medical diagnosis of G57.01 (ICD-10-CM) - Piriformis syndrome of right side. Pt presents with R shoulder pain and B hip. Buttocks, and LE pain. Presenting with weak scapular stabilizers, decreased B LE strength, and decrease B LE flexibility. Will benefit from postural reeducation, scapular stabilization, B LE stretching, B LE strengthening, and B UE strengthening to progress to below listed goals.    Pt prognosis is Excellent.   Pt will benefit from skilled outpatient Physical Therapy to address the deficits stated above and in the chart below, provide pt/family education, and to maximize pt's level of independence.     Plan of care discussed with patient: Yes  Pt's spiritual, cultural and educational needs considered and patient is agreeable to the plan of care and goals as stated below:     Anticipated " Barriers for therapy: none    Medical Necessity is demonstrated by the following  History  Co-morbidities and personal factors that may impact the plan of care Co-morbidities:   latex allergy    Personal Factors:   no deficits     moderate   Examination  Body Structures and Functions, activity limitations and participation restrictions that may impact the plan of care Body Regions:   lower extremities  upper extremities    Body Systems:    ROM  strength    Participation Restrictions:   none    Activity limitations:   Learning and applying knowledge  no deficits    General Tasks and Commands  no deficits    Communication  no deficits    Mobility  driving (bike, car, motorcycle)    Self care  no deficits    Domestic Life  sweeping    Interactions/Relationships  no deficits    Life Areas  no deficits    Community and Social Life  no deficits         moderate   Clinical Presentation stable and uncomplicated low   Decision Making/ Complexity Score: low     Goals:  Short Term Goals: 4 weeks   1. Independent with HEP.  2. Report decreased R shoulder pain < or =  4/10 with adls such as working overhead, shoveling, and sweeping.  3. Increased MMT for B UE by 1/2 muscle grade to promote proper scapular stabilization to decrease R shoulder pain < or =  4/10 with adls such as working overhead, shoveling, and sweeping.  4. Report decreased B hip and LE pain < or =  4/10 with adls such as driving and prolonged sitting.  5. Increased MMT for B LE by 1/2 muscle grade to promote proper pelvic stability to decrease B hip and LE pain < or =  4/10 with adls such as driving and prolonged sitting.        Long Term Goals: 8 weeks   6. Report decreased R shoulder pain < or = 2 /10 with adls such as working overhead, shoveling, and sweeping.  7. Increased MMT for B UE by 1 muscle grade to promote proper scapular stabilization to decrease R shoulder pain < or = 2 /10 with adls such as working overhead, shoveling, and sweeping.  8. Report  decreased B hip and LE pain < or =  2/10 with adls such as driving and prolonged sitting.  9. Increased MMT for B LE by 1 muscle grade to promote proper pelvic stability to decrease  B hip and LE pain < or =  2/10 with adls such as driving and prolonged sitting.  10. Increased flexibility in R hamstrings to -20 deg with 90/90 test to promote proper pelvic stability to decrease    11. Increased flexibility in B hip adductors, B piriformis, B hip flexors, B IT bands, and B quads to promote proper pelvic stability to decrease B hip and LE pain < or =  2/10 with adls such as driving and prolonged sitting.  12. Patient to achieve CJ (at least 20% < 40% impaired, limited or restricted) level on the FOTO Outcomes Measurement System.    Plan   Certification Period/Plan of care expiration: 3/8/2022 to 5/3/2022.    Outpatient Physical Therapy 2 times weekly for 8 weeks to include the following interventions: Gait Training, Manual Therapy, Moist Heat/ Ice, Neuromuscular Re-ed, Patient Education, Therapeutic Activities, Therapeutic Exercise and Dry Needling.     Arcadio Garcia, PT

## 2022-03-08 NOTE — PATIENT INSTRUCTIONS
Hip External Rotation - Piriformis Stretching            - Lie on your back, one knee bent and the other straight  - Grab the bent leg behind the knee with the opposite arm and pull the bent knee towards your opposite armpit  - The stretch may be felt in the outside buttock region or relatively deep in the pelvis.    The opposite knee can be bent or straight at your therapists discretion.     Perform 3 reps holding for 30 seconds.    Perform twice a day.    Copyright © 2022 [employer]      SCIATIC NERVE GLIDE - SUPINE        Start by lying on your back and holding the back of your knee. Next, attempt to straighten your knee. Lastly, hold this position and then bend your ankle forward and back as shown.       Perform 30 reps.    Perform twice a day.    Copyright © 2022 [employer]    SCAPULAR RETRACTIONS          Move your shoulder blades back and down. Hold, relax and repeat.       Hold for 5 seconds. Perform 30 reps.    Perform twice a day.    Copyright © 2022 [employer]

## 2022-03-17 ENCOUNTER — CLINICAL SUPPORT (OUTPATIENT)
Dept: REHABILITATION | Facility: OTHER | Age: 52
End: 2022-03-17
Payer: COMMERCIAL

## 2022-03-17 DIAGNOSIS — G89.29 CHRONIC RIGHT SHOULDER PAIN: Primary | ICD-10-CM

## 2022-03-17 DIAGNOSIS — M62.81 MUSCLE WEAKNESS OF UPPER EXTREMITY: ICD-10-CM

## 2022-03-17 DIAGNOSIS — M25.551 BILATERAL HIP PAIN: ICD-10-CM

## 2022-03-17 DIAGNOSIS — M25.552 BILATERAL HIP PAIN: ICD-10-CM

## 2022-03-17 DIAGNOSIS — M25.511 CHRONIC RIGHT SHOULDER PAIN: Primary | ICD-10-CM

## 2022-03-17 PROCEDURE — 97110 THERAPEUTIC EXERCISES: CPT | Mod: PN,CQ

## 2022-03-17 NOTE — PROGRESS NOTES
Physical Therapy Daily Treatment Note     Name: Sylvia Chua  Clinic Number: [id number]    Therapy Diagnosis:   Encounter Diagnoses   Name Primary?    Chronic right shoulder pain Yes    Muscle weakness of upper extremity     Bilateral hip pain      Physician: Cornelius Chan MD    Visit Date: 3/17/2022  Physician Orders: PT Eval and Treat   Medical Diagnosis: G57.01 (ICD-10-CM) - Piriformis syndrome of right side  Evaluation Date: 3/8/2022  Authorization Period Expiration: 2/7/2023  Plan of Care Certification Period: 5/3/2022  Visit # / Visits authorized: 2 (1/ 20)     Time In: 1515  Time Out: 1600  Total Billable Time separate from evaluation: 45 minutes     Precautions: Standard and Latex allergy       Subjective    Pt states feeling well w/ no c/o pn in R LE.    Pt reports: she was compliant with home exercise program given last session.   Response to previous treatment:no adverse effects  Functional change: no change    Pain: 0/10  Location: right buttocks      Objective     Sylvia received therapeutic exercises to develop strength, endurance, ROM, flexibility, posture and core stabilization for 45 minutes including:    Piriformis stretch   Sciatic Nerve glides   HL hip add ball sq 2 x 10  Hl hip abd pilates ring 2 x 10   HL clamshells otb 2 x 10  Bridges 2 x 10  Hip flexor stretch supine w/ strap 3 x 20 sec ea   Scap retraction 30 x 3 sec hold   Shld rows otb 2 x 10   Shld ext 2 x 10    Home Exercises Provided and Patient Education Provided     Education provided:   - Pt edu on proper exercise technique.      Written Home Exercises Provided: Patient instructed to cont prior HEP. Exercises were reviewed and Sylvia was able to demonstrate them prior to the end of the session.  Sylvia demonstrated good  understanding of the education provided. See EMR under Patient Instructions for exercises provided during therapy sessions.      Assessment     Pt dusty tx well w/ no c/o  pn.  Pt showed increased strength and endurance during therex.  Pt cont to have some scap and glute weakness.    Sylvia Is progressing well towards her goals.   Pt prognosis is Good.     Pt will continue to benefit from skilled outpatient physical therapy to address the deficits listed in the problem list box on initial evaluation, provide pt/family education and to maximize pt's level of independence in the home and community environment.     Pt's spiritual, cultural and educational needs considered and pt agreeable to plan of care and goals.    Anticipated barriers to physical therapy: none    Goals: Goals:  Short Term Goals: 4 weeks   1. Independent with HEP. (progressing, not met)   2. Report decreased R shoulder pain < or =  4/10 with adls such as working overhead, shoveling, and sweeping. (progressing, not met)   3. Increased MMT for B UE by 1/2 muscle grade to promote proper scapular stabilization to decrease R shoulder pain < or =  4/10 with adls such as working overhead, shoveling, and sweeping. (progressing, not met)   4. Report decreased B hip and LE pain < or =  4/10 with adls such as driving and prolonged sitting. (progressing, not met)   5. Increased MMT for B LE by 1/2 muscle grade to promote proper pelvic stability to decrease B hip and LE pain < or =  4/10 with adls such as driving and prolonged sitting.   (progressing, not met)         Long Term Goals: 8 weeks   6. Report decreased R shoulder pain < or = 2 /10 with adls such as working overhead, shoveling, and sweeping. (progressing, not met)   7. Increased MMT for B UE by 1 muscle grade to promote proper scapular stabilization to decrease R shoulder pain < or = 2 /10 with adls such as working overhead, shoveling, and sweeping. (progressing, not met)   8. Report decreased B hip and LE pain < or =  2/10 with adls such as driving and prolonged sitting. (progressing, not met)   9. Increased MMT for B LE by 1 muscle grade to promote proper pelvic  stability to decrease  B hip and LE pain < or =  2/10 with adls such as driving and prolonged sitting. (progressing, not met)   10. Increased flexibility in R hamstrings to -20 deg with 90/90 test to promote proper pelvic stability to decrease   (progressing, not met)   11. Increased flexibility in B hip adductors, B piriformis, B hip flexors, B IT bands, and B quads to promote proper pelvic stability to decrease B hip and LE pain < or =  2/10 with adls such as driving and prolonged sitting. (progressing, not met)   12. Patient to achieve CJ (at least 20% < 40% impaired, limited or restricted) level on the FOTO Outcomes Measurement System. (progressing, not met)          Plan     Cont to progress towards goals set by PT.  Work to increase LE flexibility, scap control and glute strength next visit.      Yeison Reeves, PTA

## 2022-03-28 ENCOUNTER — DOCUMENTATION ONLY (OUTPATIENT)
Dept: REHABILITATION | Facility: OTHER | Age: 52
End: 2022-03-28
Payer: COMMERCIAL

## 2022-03-28 ENCOUNTER — PATIENT MESSAGE (OUTPATIENT)
Dept: REHABILITATION | Facility: OTHER | Age: 52
End: 2022-03-28

## 2022-03-28 NOTE — PROGRESS NOTES
Pt failed to attend physical therapy today without prior notice of cancellation.          Yeison Reeves, PTA

## 2022-04-11 ENCOUNTER — ANESTHESIA (OUTPATIENT)
Dept: ENDOSCOPY | Facility: HOSPITAL | Age: 52
End: 2022-04-11
Payer: COMMERCIAL

## 2022-04-11 ENCOUNTER — ANESTHESIA EVENT (OUTPATIENT)
Dept: ENDOSCOPY | Facility: HOSPITAL | Age: 52
End: 2022-04-11
Payer: COMMERCIAL

## 2022-04-11 ENCOUNTER — HOSPITAL ENCOUNTER (OUTPATIENT)
Facility: HOSPITAL | Age: 52
Discharge: HOME | End: 2022-04-11
Attending: COLON & RECTAL SURGERY | Admitting: COLON & RECTAL SURGERY
Payer: COMMERCIAL

## 2022-04-11 VITALS
HEIGHT: 62 IN | TEMPERATURE: 98 F | WEIGHT: 100 LBS | BODY MASS INDEX: 18.4 KG/M2 | RESPIRATION RATE: 18 BRPM | OXYGEN SATURATION: 100 % | DIASTOLIC BLOOD PRESSURE: 76 MMHG | SYSTOLIC BLOOD PRESSURE: 116 MMHG | HEART RATE: 70 BPM

## 2022-04-11 DIAGNOSIS — Z12.11 COLON CANCER SCREENING: Primary | ICD-10-CM

## 2022-04-11 LAB
B-HCG UR QL: NEGATIVE
CTP QC/QA: YES

## 2022-04-11 PROCEDURE — 63600175 PHARM REV CODE 636 W HCPCS: Performed by: NURSE ANESTHETIST, CERTIFIED REGISTERED

## 2022-04-11 PROCEDURE — 25000003 PHARM REV CODE 250: Performed by: COLON & RECTAL SURGERY

## 2022-04-11 PROCEDURE — G0121 COLON CA SCRN NOT HI RSK IND: ICD-10-PCS | Mod: ,,, | Performed by: COLON & RECTAL SURGERY

## 2022-04-11 PROCEDURE — E9220 PRA ENDO ANESTHESIA: ICD-10-PCS | Mod: ,,, | Performed by: NURSE ANESTHETIST, CERTIFIED REGISTERED

## 2022-04-11 PROCEDURE — G0121 COLON CA SCRN NOT HI RSK IND: HCPCS | Performed by: COLON & RECTAL SURGERY

## 2022-04-11 PROCEDURE — E9220 PRA ENDO ANESTHESIA: HCPCS | Mod: ,,, | Performed by: NURSE ANESTHETIST, CERTIFIED REGISTERED

## 2022-04-11 PROCEDURE — 81025 URINE PREGNANCY TEST: CPT | Performed by: COLON & RECTAL SURGERY

## 2022-04-11 PROCEDURE — 37000008 HC ANESTHESIA 1ST 15 MINUTES: Performed by: COLON & RECTAL SURGERY

## 2022-04-11 PROCEDURE — G0121 COLON CA SCRN NOT HI RSK IND: HCPCS | Mod: ,,, | Performed by: COLON & RECTAL SURGERY

## 2022-04-11 PROCEDURE — 25000003 PHARM REV CODE 250: Performed by: NURSE ANESTHETIST, CERTIFIED REGISTERED

## 2022-04-11 PROCEDURE — 37000009 HC ANESTHESIA EA ADD 15 MINS: Performed by: COLON & RECTAL SURGERY

## 2022-04-11 RX ORDER — LIDOCAINE HYDROCHLORIDE 20 MG/ML
INJECTION INTRAVENOUS
Status: DISCONTINUED | OUTPATIENT
Start: 2022-04-11 | End: 2022-04-11

## 2022-04-11 RX ORDER — SODIUM CHLORIDE 9 MG/ML
INJECTION, SOLUTION INTRAVENOUS CONTINUOUS
Status: DISCONTINUED | OUTPATIENT
Start: 2022-04-11 | End: 2022-04-11 | Stop reason: HOSPADM

## 2022-04-11 RX ORDER — PROPOFOL 10 MG/ML
VIAL (ML) INTRAVENOUS
Status: DISCONTINUED | OUTPATIENT
Start: 2022-04-11 | End: 2022-04-11

## 2022-04-11 RX ORDER — PROPOFOL 10 MG/ML
VIAL (ML) INTRAVENOUS CONTINUOUS PRN
Status: DISCONTINUED | OUTPATIENT
Start: 2022-04-11 | End: 2022-04-11

## 2022-04-11 RX ADMIN — SODIUM CHLORIDE: 0.9 INJECTION, SOLUTION INTRAVENOUS at 06:04

## 2022-04-11 RX ADMIN — PROPOFOL 20 MG: 10 INJECTION, EMULSION INTRAVENOUS at 07:04

## 2022-04-11 RX ADMIN — PROPOFOL 80 MG: 10 INJECTION, EMULSION INTRAVENOUS at 07:04

## 2022-04-11 RX ADMIN — PROPOFOL 30 MG: 10 INJECTION, EMULSION INTRAVENOUS at 07:04

## 2022-04-11 RX ADMIN — LIDOCAINE HYDROCHLORIDE 50 MG: 20 INJECTION, SOLUTION INTRAVENOUS at 07:04

## 2022-04-11 RX ADMIN — Medication 200 MCG/KG/MIN: at 07:04

## 2022-04-11 NOTE — H&P
"  COLONOSCOPY HISTORY AND PHYSICAL EXAM    Procedure : Colonoscopy      INDICATIONS: asymptomatic screening exam    Family Hx of CRC: none    Last Colonoscopy:  none  Findings: na       Past Medical History:   Diagnosis Date    Dizziness     Migraine headache      Sedation Problems: NO  Family History   Problem Relation Age of Onset    Diabetes Mother     Coronary artery disease Mother     Ataxia Father     Coronary artery disease Brother     Stroke Brother     COPD Brother      Fam Hx of Sedation Problems: NO  Social History     Socioeconomic History    Marital status:    Occupational History    Occupation: Homemaker   Tobacco Use    Smoking status: Never Smoker    Smokeless tobacco: Never Used   Substance and Sexual Activity    Alcohol use: Yes    Drug use: No       Review of Systems - Negative except   Respiratory ROS: no dyspnea  Cardiovascular ROS: no exertional chest pain  Gastrointestinal ROS: NO abdominal discomfort,  NO rectal bleeding  Musculoskeletal ROS: no muscular pain  Neurological ROS: no recent stroke    Physical Exam:  /79 (Patient Position: Lying)   Pulse 98   Temp 98 °F (36.7 °C) (Temporal)   Resp 16   Ht 5' 2" (1.575 m)   Wt 45.4 kg (100 lb)   SpO2 100%   Breastfeeding No   BMI 18.29 kg/m²   General: no distress  Head: normocephalic  Mallampati Score   Neck: supple, symmetrical, trachea midline  Lungs:  normal respiratory effort  Heart: regular rate and rhythm  Abdomen: soft, non-tender non-distented; bowel sounds normal; no masses,  no organomegaly  Extremities: no cyanosis or edema, or clubbing    ASA:  II    PLAN  COLONOSCOPY.    SedationPlan :MAC    The details of the procedure, the possible need for biopsy or polypectomy and the potential risks including bleeding, perforation, missed polyps were discussed in detail.      "

## 2022-04-11 NOTE — [employer]OVATION PATIENT INSTRUCTIONS
Discharge Summary/Instructions after an Endoscopic Procedure  Patient Name: Sylvia Chua  Patient MRN: [id number]  Patient YOB: 1970 Monday, April 11, 2022  Ronald Lozoya MD  Dear patient,  As a result of recent federal legislation (The Federal Cures Act), you may   receive lab or pathology results from your procedure in your [facility]   account before your physician is able to contact you. Your physician or   their representative will relay the results to you with their   recommendations at their soonest availability.  Thank you,  RESTRICTIONS:  During your procedure today, you received medications for sedation.  These   medications may affect your judgment, balance and coordination.  Therefore,   for 24 hours, you have the following restrictions:   - DO NOT drive a car, operate machinery, make legal/financial decisions,   sign important papers or drink alcohol.    ACTIVITY:  Today: no heavy lifting, straining or running due to procedural   sedation/anesthesia.  The following day: return to full activity including work.  DIET:  Eat and drink normally unless instructed otherwise.     TREATMENT FOR COMMON SIDE EFFECTS:  - Mild abdominal pain, nausea, belching, bloating or excessive gas:  rest,   eat lightly and use a heating pad.  - Sore Throat: treat with throat lozenges and/or gargle with warm salt   water.  - Because air was used during the procedure, expelling large amounts of air   from your rectum or belching is normal.  - If a bowel prep was taken, you may not have a bowel movement for 1-3 days.    This is normal.  SYMPTOMS TO WATCH FOR AND REPORT TO YOUR PHYSICIAN:  1. Abdominal pain or bloating, other than gas cramps.  2. Chest pain.  3. Back pain.  4. Signs of infection such as: chills or fever occurring within 24 hours   after the procedure.  5. Rectal bleeding, which would show as bright red, maroon, or black stools.   (A tablespoon of blood from the rectum is not serious, especially  if   hemorrhoids are present.)  6. Vomiting.  7. Weakness or dizziness.  GO DIRECTLY TO THE NEAREST EMERGENCY ROOM IF YOU HAVE ANY OF THE FOLLOWING:      Difficulty breathing              Chills and/or fever over 101 F   Persistent vomiting and/or vomiting blood   Severe abdominal pain   Severe chest pain   Black, tarry stools   Bleeding- more than one tablespoon   Any other symptom or condition that you feel may need urgent attention  Your doctor recommends these additional instructions:  If any biopsies were taken, your doctors clinic will contact you in 1 to 2   weeks with any results.  - Patient has a contact number available for emergencies.  The signs and   symptoms of potential delayed complications were discussed with the   patient.  Return to normal activities tomorrow.  Written discharge   instructions were provided to the patient.   - Discharge patient to home (ambulatory).   - Resume regular diet indefinitely.   - Continue present medications.   - Repeat colonoscopy in 10 years for screening purposes.  For questions, problems or results please call your physician - Ronald Lozoya MD at Work:  [phone].  [facility] [place] [facility], EMERGENCY ROOM PHONE NUMBER: [phone]  IF A COMPLICATION OR EMERGENCY SITUATION ARISES AND YOU ARE UNABLE TO REACH   YOUR PHYSICIAN - GO DIRECTLY TO THE EMERGENCY ROOM.  Ronald Lozoya MD  4/11/2022 7:28:21 AM  This report has been verified and signed electronically.  Dear patient,  As a result of recent federal legislation (The Federal Cures Act), you may   receive lab or pathology results from your procedure in your [facility]   account before your physician is able to contact you. Your physician or   their representative will relay the results to you with their   recommendations at their soonest availability.  Thank you,  [employer]ATION

## 2022-04-11 NOTE — ANESTHESIA POSTPROCEDURE EVALUATION
Anesthesia Post Evaluation    Patient: Sylvia Chua    Procedure(s) Performed: Procedure(s) (LRB):  COLONOSCOPY (N/A)    Final Anesthesia Type: general      Patient location during evaluation: PACU  Patient participation: Yes- Able to Participate  Level of consciousness: awake and alert  Post-procedure vital signs: reviewed and stable  Pain management: adequate  Airway patency: patent    PONV status at discharge: No PONV  Anesthetic complications: no      Cardiovascular status: hemodynamically stable  Respiratory status: spontaneous ventilation  Follow-up not needed.          Vitals Value Taken Time   /56 04/11/22 0732   Temp 36.6 °C (97.8 °F) 04/11/22 0732   Pulse 70 04/11/22 0732   Resp 16 04/11/22 0732   SpO2 100 % 04/11/22 0732         No case tracking events are documented in the log.      Pain/Marita Score: Marita Score: 9 (4/11/2022  7:33 AM)

## 2022-04-11 NOTE — TRANSFER OF CARE
"Anesthesia Transfer of Care Note    Patient: Sylvia Chua    Procedure(s) Performed: Procedure(s) (LRB):  COLONOSCOPY (N/A)    Patient location: OPS    Anesthesia Type: general    Transport from OR: Transported from OR on room air with adequate spontaneous ventilation    Post pain: adequate analgesia    Post assessment: no apparent anesthetic complications and tolerated procedure well    Post vital signs: stable    Level of consciousness: awake, alert and oriented    Nausea/Vomiting: no nausea/vomiting    Complications: none    Transfer of care protocol was followed      Last vitals:   Visit Vitals  /79 (Patient Position: Lying)   Pulse 98   Temp 36.7 °C (98 °F) (Temporal)   Resp 16   Ht 5' 2" (1.575 m)   Wt 45.4 kg (100 lb)   SpO2 100%   Breastfeeding No   BMI 18.29 kg/m²     "

## 2022-04-11 NOTE — ANESTHESIA PREPROCEDURE EVALUATION
04/11/2022  Sylvia Chua is a 51 y.o., female.     Past Medical History:   Diagnosis Date    Dizziness     Migraine headache      Past Surgical History:   Procedure Laterality Date    BREAST BIOPSY Right 10/21/2020    benign           Pre-op Assessment    I have reviewed the Patient Summary Reports.       I have reviewed the Medications.     Review of Systems  Anesthesia Hx:  No problems with previous Anesthesia  Denies Family Hx of Anesthesia complications.   Denies Personal Hx of Anesthesia complications.   Social:  Non-Smoker    Hematology/Oncology:  Hematology Normal   Oncology Normal     EENT/Dental:EENT/Dental Normal   Cardiovascular:  Cardiovascular Normal Exercise tolerance: good     Pulmonary:  Pulmonary Normal    Renal/:  Renal/ Normal     Hepatic/GI:  Hepatic/GI Normal    Musculoskeletal:  Musculoskeletal Normal    Neurological:   Neuromuscular Disease, Headaches    Endocrine:  Endocrine Normal    Dermatological:  Skin Normal    Psych:   Psychiatric History          Physical Exam  General: Well nourished    Airway:  Mallampati: I   Mouth Opening: Normal  TM Distance: Normal  Neck ROM: Normal ROM    Dental:  Intact        Anesthesia Plan  Type of Anesthesia, risks & benefits discussed:    Anesthesia Type: Gen Natural Airway  Intra-op Monitoring Plan: Standard ASA Monitors  Induction:  IV  Informed Consent: Informed consent signed with the Patient and all parties understand the risks and agree with anesthesia plan.  All questions answered.   ASA Score: 2  Day of Surgery Review of History & Physical: H&P Update referred to the surgeon/provider.    Ready For Surgery From Anesthesia Perspective.     .

## 2022-05-09 ENCOUNTER — OFFICE VISIT (OUTPATIENT)
Dept: INTERNAL MEDICINE | Facility: CLINIC | Age: 52
End: 2022-05-09
Payer: COMMERCIAL

## 2022-05-09 VITALS
SYSTOLIC BLOOD PRESSURE: 120 MMHG | WEIGHT: 99 LBS | DIASTOLIC BLOOD PRESSURE: 70 MMHG | HEIGHT: 62 IN | BODY MASS INDEX: 18.22 KG/M2

## 2022-05-09 DIAGNOSIS — F41.9 ANXIETY: ICD-10-CM

## 2022-05-09 DIAGNOSIS — G89.29 CHRONIC PAIN OF RIGHT KNEE: Primary | ICD-10-CM

## 2022-05-09 DIAGNOSIS — M25.561 CHRONIC PAIN OF RIGHT KNEE: Primary | ICD-10-CM

## 2022-05-09 DIAGNOSIS — G57.01 PIRIFORMIS SYNDROME OF RIGHT SIDE: ICD-10-CM

## 2022-05-09 PROCEDURE — 3008F PR BODY MASS INDEX (BMI) DOCUMENTED: ICD-10-PCS | Mod: CPTII,S$GLB,, | Performed by: INTERNAL MEDICINE

## 2022-05-09 PROCEDURE — 3008F BODY MASS INDEX DOCD: CPT | Mod: CPTII,S$GLB,, | Performed by: INTERNAL MEDICINE

## 2022-05-09 PROCEDURE — 1159F MED LIST DOCD IN RCRD: CPT | Mod: CPTII,S$GLB,, | Performed by: INTERNAL MEDICINE

## 2022-05-09 PROCEDURE — 3074F PR MOST RECENT SYSTOLIC BLOOD PRESSURE < 130 MM HG: ICD-10-PCS | Mod: CPTII,S$GLB,, | Performed by: INTERNAL MEDICINE

## 2022-05-09 PROCEDURE — 3044F HG A1C LEVEL LT 7.0%: CPT | Mod: CPTII,S$GLB,, | Performed by: INTERNAL MEDICINE

## 2022-05-09 PROCEDURE — 99999 PR PBB SHADOW E&M-EST. PATIENT-LVL IV: CPT | Mod: PBBFAC,,, | Performed by: INTERNAL MEDICINE

## 2022-05-09 PROCEDURE — 99999 PR PBB SHADOW E&M-EST. PATIENT-LVL IV: ICD-10-PCS | Mod: PBBFAC,,, | Performed by: INTERNAL MEDICINE

## 2022-05-09 PROCEDURE — 1159F PR MEDICATION LIST DOCUMENTED IN MEDICAL RECORD: ICD-10-PCS | Mod: CPTII,S$GLB,, | Performed by: INTERNAL MEDICINE

## 2022-05-09 PROCEDURE — 3078F PR MOST RECENT DIASTOLIC BLOOD PRESSURE < 80 MM HG: ICD-10-PCS | Mod: CPTII,S$GLB,, | Performed by: INTERNAL MEDICINE

## 2022-05-09 PROCEDURE — 3078F DIAST BP <80 MM HG: CPT | Mod: CPTII,S$GLB,, | Performed by: INTERNAL MEDICINE

## 2022-05-09 PROCEDURE — 3044F PR MOST RECENT HEMOGLOBIN A1C LEVEL <7.0%: ICD-10-PCS | Mod: CPTII,S$GLB,, | Performed by: INTERNAL MEDICINE

## 2022-05-09 PROCEDURE — 1160F RVW MEDS BY RX/DR IN RCRD: CPT | Mod: CPTII,S$GLB,, | Performed by: INTERNAL MEDICINE

## 2022-05-09 PROCEDURE — 3074F SYST BP LT 130 MM HG: CPT | Mod: CPTII,S$GLB,, | Performed by: INTERNAL MEDICINE

## 2022-05-09 PROCEDURE — 1160F PR REVIEW ALL MEDS BY PRESCRIBER/CLIN PHARMACIST DOCUMENTED: ICD-10-PCS | Mod: CPTII,S$GLB,, | Performed by: INTERNAL MEDICINE

## 2022-05-09 PROCEDURE — 99214 PR OFFICE/OUTPT VISIT, EST, LEVL IV, 30-39 MIN: ICD-10-PCS | Mod: S$GLB,,, | Performed by: INTERNAL MEDICINE

## 2022-05-09 PROCEDURE — 99214 OFFICE O/P EST MOD 30 MIN: CPT | Mod: S$GLB,,, | Performed by: INTERNAL MEDICINE

## 2022-05-09 NOTE — PROGRESS NOTES
"    CHIEF COMPLAINT     Chief Complaint   Patient presents with    Follow-up     Pt has concerns about right knee and also both shoulders. Hip is healing       HPI     Sylvia Chua is a 51 y.o. female here today for   The by me approximately 3 months ago for piriformis syndrome of right side.  Reports that she went to 3 sessions of physical therapy and stop going.  Reports that she quit her job and symptoms improved.    Reports having issues with chronic right knee pain worsened by spending time on help for knees.  Reports that when she is not kneeling symptoms improved.  Has pain both kind of anteriorly and inferiorly to her knee.  No locking no catching no history of trauma.  She gets relief when she wears knee pads    Personally Reviewed Patient's Medical, surgical, family and social hx. Changes updated in [employer].  Care Team updated in Epic    Review of Systems:  Review of Systems   Musculoskeletal: Positive for arthralgias and joint swelling.       Health Maintenance:   Reviewed with patient  Due for the following:      PHYSICAL EXAM     /70 (BP Location: Right arm, Patient Position: Sitting, BP Method: Medium (Manual))   Ht 5' 2" (1.575 m)   Wt 44.9 kg (99 lb)   BMI 18.11 kg/m²     Gen: Well Appearing, NAD  HEENT: PERR, EOMI  Neck: FROM, no thyromegaly, no cervical adenopathy  CVD: RRR, no M/R/G  Pulm: Normal work of breathing, CTAB, no wheezing  Abd:  Soft, NT, ND non TTP, no mass  MSK: no LE edema  Neuro: A&Ox3, gait normal, speech normal  Mood; Mood normal, behavior normal, thought process linear       LABS     Labs reviewed; Notable for  Lab Results   Component Value Date    WBC 8.56 02/08/2022    HGB 12.9 02/08/2022    HCT 41.3 02/08/2022    MCV 99 (H) 02/08/2022     02/08/2022         Chemistry        Component Value Date/Time     02/08/2022 0712    K 4.3 02/08/2022 0712     02/08/2022 0712    CO2 27 02/08/2022 0712    BUN 15 02/08/2022 0712    CREATININE 0.8 " 02/08/2022 0712    GLU 96 02/08/2022 0712        Component Value Date/Time    CALCIUM 9.9 02/08/2022 0712    ALKPHOS 57 02/08/2022 0712    AST 20 02/08/2022 0712    ALT 16 02/08/2022 0712    BILITOT 0.8 02/08/2022 0712    ESTGFRAFRICA >60.0 02/08/2022 0712    EGFRNONAA >60.0 02/08/2022 0712            ASSESSMENT     1. Chronic pain of right knee  Ambulatory referral/consult to [facility] (MEDFIT)    [facility] MYCHART ASSIGN QUESTIONNAIRE SERIES (MEDFIT)    MyChart Patient Entered [facility] (MEDFIT)    Ambulatory referral/consult to Physical/Occupational Therapy   2. Piriformis syndrome of right side  Ambulatory referral/consult to [facility] (MEDFIT)    [facility] MYCHART ASSIGN QUESTIONNAIRE SERIES (MEDFIT)    MyChart Patient Entered [facility] (MEDFIT)    Ambulatory referral/consult to Physical/Occupational Therapy   3. Anxiety  Ambulatory referral/consult to Psychology           Plan     Sylvia Chua is a 51 y.o. female with  1. Chronic pain of right knee  Send to physical therapy for assessment  - Ambulatory referral/consult to [facility] (MEDFIT); Future  - [facility] MYCHART ASSIGN QUESTIONNAIRE SERIES (MEDFIT)  - MyChart Patient Entered [facility] (MEDFIT)  - Ambulatory referral/consult to Physical/Occupational Therapy; Future    2. Piriformis syndrome of right side  Send to physical therapy for assessment  Wants to try medical fitness if physical therapy is not good fit  - Ambulatory referral/consult to [facility] (MEDFIT); Future  - [facility] MYCHART ASSIGN QUESTIONNAIRE SERIES (MEDFIT)  [facility]  - Ambulatory referral/consult to Physical/Occupational Therapy; Future    3. Anxiety  Refer back to behavioral health for adjustment disorder with anxious symptoms  - Ambulatory referral/consult to Psychology; Future      Cornelius Chan MD

## 2022-08-19 ENCOUNTER — TELEPHONE (OUTPATIENT)
Dept: INTERNAL MEDICINE | Facility: CLINIC | Age: 52
End: 2022-08-19
Payer: COMMERCIAL

## 2022-08-19 ENCOUNTER — PATIENT MESSAGE (OUTPATIENT)
Dept: INTERNAL MEDICINE | Facility: CLINIC | Age: 52
End: 2022-08-19
Payer: COMMERCIAL

## 2022-08-19 NOTE — TELEPHONE ENCOUNTER
Called and spoke to pt. Pt states she started with symptoms Wednesday and tested positive on Thursday. Pt c/o fever (unknown how high), body aches, nasal congestion and chills. Pt denies CP or SOB. Reviewed symptomatic care and isolation protocol.    covid risk 0

## 2022-08-19 NOTE — TELEPHONE ENCOUNTER
----- Message from Tello Johnson MA sent at 8/19/2022  9:38 AM CDT -----  Contact: Pt Mobile [phone]    ----- Message -----  From: Milagro Davis  Sent: 8/19/2022   9:13 AM CDT  To: Dustin Shields Staff    Patient is returning a phone call.  Who left a message for the patient: Iraida Willett RN  Does patient know what this is regarding:  A message from Iraida Willett RN  Would you like a call back, or a response through your [facility] portal?:   Call

## 2022-08-19 NOTE — TELEPHONE ENCOUNTER
----- Message from Milagro Davis sent at 8/19/2022  9:12 AM CDT -----  Contact: Pt Mobile [phone]  Patient is returning a phone call.  Who left a message for the patient: Iraida Willett RN  Does patient know what this is regarding:  A message from Iraida Willett RN  Would you like a call back, or a response through your [facility] portal?:   Call

## 2022-08-29 ENCOUNTER — TELEPHONE (OUTPATIENT)
Dept: INTERNAL MEDICINE | Facility: CLINIC | Age: 52
End: 2022-08-29
Payer: COMMERCIAL

## 2022-08-29 NOTE — TELEPHONE ENCOUNTER
----- Message from Terry Jasmine sent at 8/29/2022  1:30 PM CDT -----  Contact: self [phone]  Per pt she survived covid and also requesting an order for  x-ray right shoulder pain. 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    Please call and advise

## 2022-08-29 NOTE — TELEPHONE ENCOUNTER
Pt c/o Right shoulder pain and requesting Xray. 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 Would you prefer pt schedule for UC appt?

## 2022-08-30 ENCOUNTER — TELEPHONE (OUTPATIENT)
Dept: INTERNAL MEDICINE | Facility: CLINIC | Age: 52
End: 2022-08-30
Payer: COMMERCIAL

## 2022-08-30 NOTE — TELEPHONE ENCOUNTER
----- Message from Annette Dodd sent at 8/30/2022  9:52 AM CDT -----  Contact: Self/[phone]  Patient is returning a phone call.  Who left a message for the patient: Iraida   Does patient know what this is regarding:  Yes  Would you like a call back, or a response through your [facility] portal?:   call back   Comments: pt stated to schedule the appt and just send her the information       Pt slept intermittently throughout the night, easily arousable for care and able to make 
needs known. On room air saturating at 95%. Medicated pain x1 and noted effective. MOM PRN 
given, no bowel movement noted yet. All needs attended. Call light placed within reach. 
Frequent visual checks done. Will endorse to next shift for continuity of care.

-------------------------------------------------------------------------------

Addendum: 12/03/21 at 0733 by CORTES HENDRICKS RN

-------------------------------------------------------------------------------

Initial saturation 90%, picks up quickly to 95-96%.

## 2022-08-31 ENCOUNTER — HOSPITAL ENCOUNTER (OUTPATIENT)
Dept: RADIOLOGY | Facility: HOSPITAL | Age: 52
Discharge: HOME | End: 2022-08-31
Attending: PHYSICIAN ASSISTANT
Payer: COMMERCIAL

## 2022-08-31 ENCOUNTER — OFFICE VISIT (OUTPATIENT)
Dept: INTERNAL MEDICINE | Facility: CLINIC | Age: 52
End: 2022-08-31
Payer: COMMERCIAL

## 2022-08-31 VITALS
HEIGHT: 62 IN | WEIGHT: 104.63 LBS | BODY MASS INDEX: 19.25 KG/M2 | SYSTOLIC BLOOD PRESSURE: 108 MMHG | DIASTOLIC BLOOD PRESSURE: 60 MMHG | OXYGEN SATURATION: 98 % | HEART RATE: 102 BPM

## 2022-08-31 DIAGNOSIS — M25.511 ACUTE PAIN OF RIGHT SHOULDER: Primary | ICD-10-CM

## 2022-08-31 DIAGNOSIS — M25.511 ACUTE PAIN OF RIGHT SHOULDER: ICD-10-CM

## 2022-08-31 PROCEDURE — 1159F MED LIST DOCD IN RCRD: CPT | Mod: CPTII,S$GLB,, | Performed by: PHYSICIAN ASSISTANT

## 2022-08-31 PROCEDURE — 99999 PR PBB SHADOW E&M-EST. PATIENT-LVL IV: CPT | Mod: PBBFAC,,, | Performed by: PHYSICIAN ASSISTANT

## 2022-08-31 PROCEDURE — 3074F SYST BP LT 130 MM HG: CPT | Mod: CPTII,S$GLB,, | Performed by: PHYSICIAN ASSISTANT

## 2022-08-31 PROCEDURE — 99999 PR PBB SHADOW E&M-EST. PATIENT-LVL IV: ICD-10-PCS | Mod: PBBFAC,,, | Performed by: PHYSICIAN ASSISTANT

## 2022-08-31 PROCEDURE — 3044F PR MOST RECENT HEMOGLOBIN A1C LEVEL <7.0%: ICD-10-PCS | Mod: CPTII,S$GLB,, | Performed by: PHYSICIAN ASSISTANT

## 2022-08-31 PROCEDURE — 1160F RVW MEDS BY RX/DR IN RCRD: CPT | Mod: CPTII,S$GLB,, | Performed by: PHYSICIAN ASSISTANT

## 2022-08-31 PROCEDURE — 73030 X-RAY EXAM OF SHOULDER: CPT | Mod: TC,RT

## 2022-08-31 PROCEDURE — 1160F PR REVIEW ALL MEDS BY PRESCRIBER/CLIN PHARMACIST DOCUMENTED: ICD-10-PCS | Mod: CPTII,S$GLB,, | Performed by: PHYSICIAN ASSISTANT

## 2022-08-31 PROCEDURE — 3074F PR MOST RECENT SYSTOLIC BLOOD PRESSURE < 130 MM HG: ICD-10-PCS | Mod: CPTII,S$GLB,, | Performed by: PHYSICIAN ASSISTANT

## 2022-08-31 PROCEDURE — 73030 X-RAY EXAM OF SHOULDER: CPT | Mod: 26,RT,, | Performed by: RADIOLOGY

## 2022-08-31 PROCEDURE — 73030 XR SHOULDER COMPLETE 2 OR MORE VIEWS RIGHT: ICD-10-PCS | Mod: 26,RT,, | Performed by: RADIOLOGY

## 2022-08-31 PROCEDURE — 3008F BODY MASS INDEX DOCD: CPT | Mod: CPTII,S$GLB,, | Performed by: PHYSICIAN ASSISTANT

## 2022-08-31 PROCEDURE — 3008F PR BODY MASS INDEX (BMI) DOCUMENTED: ICD-10-PCS | Mod: CPTII,S$GLB,, | Performed by: PHYSICIAN ASSISTANT

## 2022-08-31 PROCEDURE — 3078F DIAST BP <80 MM HG: CPT | Mod: CPTII,S$GLB,, | Performed by: PHYSICIAN ASSISTANT

## 2022-08-31 PROCEDURE — 1159F PR MEDICATION LIST DOCUMENTED IN MEDICAL RECORD: ICD-10-PCS | Mod: CPTII,S$GLB,, | Performed by: PHYSICIAN ASSISTANT

## 2022-08-31 PROCEDURE — 99213 OFFICE O/P EST LOW 20 MIN: CPT | Mod: S$GLB,,, | Performed by: PHYSICIAN ASSISTANT

## 2022-08-31 PROCEDURE — 3078F PR MOST RECENT DIASTOLIC BLOOD PRESSURE < 80 MM HG: ICD-10-PCS | Mod: CPTII,S$GLB,, | Performed by: PHYSICIAN ASSISTANT

## 2022-08-31 PROCEDURE — 99213 PR OFFICE/OUTPT VISIT, EST, LEVL III, 20-29 MIN: ICD-10-PCS | Mod: S$GLB,,, | Performed by: PHYSICIAN ASSISTANT

## 2022-08-31 PROCEDURE — 3044F HG A1C LEVEL LT 7.0%: CPT | Mod: CPTII,S$GLB,, | Performed by: PHYSICIAN ASSISTANT

## 2022-08-31 NOTE — PROGRESS NOTES
Subjective:       Patient ID: Sylvia Chua is a 52 y.o. female.        Chief Complaint: Shoulder Pain (Shoulder pain for 5 months or longer. )    Sylvia Chua is an established patient of Cornelius Chan MD here today for urgent care visit.    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  She was very physical with her right side as she is RHD.  She notes right sided pain (shoulder, hip, knee, wrist, hand) from all the repetitive activity.  She decided to quit this job as it was taking such a physical toll on her body.  She has d/c x 5 months.  She did PT briefly for her right hip and knee and this has all resolved.  Hand and wrist are fine as well.    Right shoulder pain has persisted and progressively worsened.  Now with decreased range of motion.  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  This really bothers her shoulder.      No numbness, tingling, weakness.         Review of Systems   Constitutional:  Negative for chills, diaphoresis, fatigue and fever.   HENT:  Negative for congestion and sore throat.    Eyes:  Negative for visual disturbance.   Respiratory:  Negative for cough, chest tightness and shortness of breath.    Cardiovascular:  Negative for chest pain, palpitations and leg swelling.   Gastrointestinal:  Negative for abdominal pain, blood in stool, constipation, diarrhea, nausea and vomiting.   Genitourinary:  Negative for dysuria, frequency, hematuria and urgency.   Musculoskeletal:  Positive for arthralgias. Negative for back pain.   Skin:  Negative for rash.   Neurological:  Negative for dizziness, syncope, weakness and headaches.   Psychiatric/Behavioral:  Negative for dysphoric mood and sleep disturbance. The patient is not nervous/anxious.      Objective:      Physical Exam  Vitals and nursing note reviewed.   Constitutional:       Appearance:  "Normal appearance. She is well-developed.   HENT:      Head: Normocephalic.      Right Ear: External ear normal.      Left Ear: External ear normal.   Eyes:      Pupils: Pupils are equal, round, and reactive to light.   Cardiovascular:      Rate and Rhythm: Normal rate and regular rhythm.      Heart sounds: Normal heart sounds. No murmur heard.    No friction rub. No gallop.   Pulmonary:      Effort: Pulmonary effort is normal. No respiratory distress.      Breath sounds: Normal breath sounds.   Abdominal:      Palpations: Abdomen is soft.      Tenderness: There is no abdominal tenderness.   Musculoskeletal:      Right shoulder: Tenderness present. No swelling, bony tenderness or crepitus. Decreased range of motion. Normal strength. Normal pulse.   Skin:     General: Skin is warm and dry.   Neurological:      Mental Status: She is alert.       Assessment:       1. Acute pain of right shoulder        Plan:       Sylvia was seen today for shoulder pain.    Diagnoses and all orders for this visit:    Acute pain of right shoulder  -     X-ray Shoulder 2 or More Views Right; Future  -     Ambulatory referral/consult to Sports Medicine; Future    Will check an x-ray today and refer to sports medicine to evaluate further as range of motion is significantly diminished and she leads a very active lifestyle    Pt has been given instructions populated from [employer] database and has verbalized understanding of the after visit summary and information contained wherein.    Follow up with a primary care provider. May go to ER for acute shortness of breath, lightheadedness, fever, or any other emergent complaints or changes in condition.    "This note will be shared with the patient"    Future Appointments   Date Time Provider Department Center   9/1/2022  9:30 AM NARA Bay MD [facility]                   "

## 2022-09-01 ENCOUNTER — OFFICE VISIT (OUTPATIENT)
Dept: SPORTS MEDICINE | Facility: CLINIC | Age: 52
End: 2022-09-01
Payer: COMMERCIAL

## 2022-09-01 VITALS
BODY MASS INDEX: 19.32 KG/M2 | HEIGHT: 62 IN | WEIGHT: 105 LBS | SYSTOLIC BLOOD PRESSURE: 100 MMHG | DIASTOLIC BLOOD PRESSURE: 67 MMHG

## 2022-09-01 DIAGNOSIS — M75.01 ADHESIVE CAPSULITIS OF RIGHT SHOULDER: Primary | ICD-10-CM

## 2022-09-01 DIAGNOSIS — M25.511 ACUTE PAIN OF RIGHT SHOULDER: ICD-10-CM

## 2022-09-01 PROCEDURE — 99204 PR OFFICE/OUTPT VISIT, NEW, LEVL IV, 45-59 MIN: ICD-10-PCS | Mod: 25,S$GLB,, | Performed by: ORTHOPAEDIC SURGERY

## 2022-09-01 PROCEDURE — 3078F PR MOST RECENT DIASTOLIC BLOOD PRESSURE < 80 MM HG: ICD-10-PCS | Mod: CPTII,S$GLB,, | Performed by: ORTHOPAEDIC SURGERY

## 2022-09-01 PROCEDURE — 20610 DRAIN/INJ JOINT/BURSA W/O US: CPT | Mod: RT,S$GLB,, | Performed by: ORTHOPAEDIC SURGERY

## 2022-09-01 PROCEDURE — 99999 PR PBB SHADOW E&M-EST. PATIENT-LVL III: ICD-10-PCS | Mod: PBBFAC,,, | Performed by: ORTHOPAEDIC SURGERY

## 2022-09-01 PROCEDURE — 3044F HG A1C LEVEL LT 7.0%: CPT | Mod: CPTII,S$GLB,, | Performed by: ORTHOPAEDIC SURGERY

## 2022-09-01 PROCEDURE — 1159F PR MEDICATION LIST DOCUMENTED IN MEDICAL RECORD: ICD-10-PCS | Mod: CPTII,S$GLB,, | Performed by: ORTHOPAEDIC SURGERY

## 2022-09-01 PROCEDURE — 3044F PR MOST RECENT HEMOGLOBIN A1C LEVEL <7.0%: ICD-10-PCS | Mod: CPTII,S$GLB,, | Performed by: ORTHOPAEDIC SURGERY

## 2022-09-01 PROCEDURE — 3074F PR MOST RECENT SYSTOLIC BLOOD PRESSURE < 130 MM HG: ICD-10-PCS | Mod: CPTII,S$GLB,, | Performed by: ORTHOPAEDIC SURGERY

## 2022-09-01 PROCEDURE — 3074F SYST BP LT 130 MM HG: CPT | Mod: CPTII,S$GLB,, | Performed by: ORTHOPAEDIC SURGERY

## 2022-09-01 PROCEDURE — 99204 OFFICE O/P NEW MOD 45 MIN: CPT | Mod: 25,S$GLB,, | Performed by: ORTHOPAEDIC SURGERY

## 2022-09-01 PROCEDURE — 99999 PR PBB SHADOW E&M-EST. PATIENT-LVL III: CPT | Mod: PBBFAC,,, | Performed by: ORTHOPAEDIC SURGERY

## 2022-09-01 PROCEDURE — 1159F MED LIST DOCD IN RCRD: CPT | Mod: CPTII,S$GLB,, | Performed by: ORTHOPAEDIC SURGERY

## 2022-09-01 PROCEDURE — 20610 LARGE JOINT ASPIRATION/INJECTION: R GLENOHUMERAL: ICD-10-PCS | Mod: RT,S$GLB,, | Performed by: ORTHOPAEDIC SURGERY

## 2022-09-01 PROCEDURE — 3078F DIAST BP <80 MM HG: CPT | Mod: CPTII,S$GLB,, | Performed by: ORTHOPAEDIC SURGERY

## 2022-09-01 RX ORDER — CELECOXIB 200 MG/1
200 CAPSULE ORAL DAILY
Qty: 30 CAPSULE | Refills: 1 | Status: SHIPPED | OUTPATIENT
Start: 2022-09-01

## 2022-09-01 RX ADMIN — TRIAMCINOLONE ACETONIDE 40 MG: 40 INJECTION, SUSPENSION INTRA-ARTICULAR; INTRAMUSCULAR at 09:09

## 2022-09-01 NOTE — PROGRESS NOTES
CC: Right shoulder pain     52 y.o. Female presents as a new patient to me. She  works as a  but she says this was too much for her and she recently quit that job. 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 Complaint is right shoulder pain and lack of motion for approximately 5 months duration. Atraumatic onset. Pain localizes laterally and anteriorly. Worse with activity. Pain gets to as bad as 9/10 but is usually a 2/10 baseline. Pain is disruptive to sleep at night. Better with rest. Denies neck pain or radicular symptoms. Treatment thus far has included activity modifications, rest, and oral medication.  She has not had any injections or surgeries on this shoulder in her past. Here today to discuss diagnosis and treatment options.     Negative for tobacco.   Negative for diabetes.     Pain Score:   4    PAST MEDICAL HISTORY:   Past Medical History:   Diagnosis Date    Dizziness     Migraine headache        PAST SURGICAL HISTORY:  Past Surgical History:   Procedure Laterality Date    BREAST BIOPSY Right 10/21/2020    benign    COLONOSCOPY N/A 4/11/2022    Procedure: COLONOSCOPY;  Surgeon: Ronald Lozoya MD;  Location: [facility] ([address] [place] [address]);  Service: Endoscopy;  Laterality: N/A;  requesting early am  fully vaccinated  4/8 [facility] with arrival time-RB       FAMILY HISTORY:  Family History   Problem Relation Age of Onset    Diabetes Mother     Coronary artery disease Mother     Ataxia Father     Coronary artery disease Brother     Stroke Brother     COPD Brother        MEDICATIONS:    Current Outpatient Medications:     celecoxib (CELEBREX) 200 MG capsule, Take 1 capsule (200 mg total) by mouth once daily., Disp: 30 capsule, Rfl: 1    EPINEPHrine 0.3 mg/0.3 mL Syrg, Inject 0.3 mLs (0.3 mg total) into the skin once. for 1 dose (Patient not taking: Reported on 8/31/2022), Disp: 2 each, Rfl: 1    ALLERGIES:  Review of patient's allergies indicates:  No Known Allergies    "  REVIEW OF SYSTEMS:  Constitution: Negative. Negative for chills, fever and night sweats.    Hematologic/Lymphatic: Negative for bleeding problem. Does not bruise/bleed easily.   Skin: Negative for dry skin, itching and rash.   Musculoskeletal: Negative for falls. Positive for right shoulder pain and muscle weakness.     All other review of symptoms were reviewed and found to be noncontributory.     PHYSICAL EXAMINATION:  Vitals:  /67   Ht 5' 2" (1.575 m)   Wt 47.6 kg (105 lb)   BMI 19.20 kg/m²    General: Well-developed well-nourished 52 y.o. femalein no acute distress   Cardiovascular: Regular rhythm by palpation of distal pulse, normal color and temperature, no concerning varicosities on symptomatic side   Lungs: No labored breathing or wheezing appreciated   Neuro: Alert and oriented ×3   Psychiatric: well oriented to person, place and time, demonstrates normal mood and affect   Skin: No rashes, lesions or ulcers, normal temperature, turgor, and texture on uninvolved extremity    Ortho/SPM Exam  Examination of the right shoulder demonstrates active forward elevation to 90, ER with arm at side to 10 IR to back pocket. Passive FE to 90, ER to 10. Prominent tenderness along the proximal biceps tendon. Negative AC tenderness. 5/5 resisted supraspinatus testing. 5/5 resisted infraspinatus testing. Negative belly press test. Stable shoulder. No midline neck tenderness. Negative Spurling's maneuver.     IMAGING:  Xrays including AP, Outlet and Axillary Lateral of RIGHT shoulder are ordered / images reviewed by me:   No fracture or acute change appreciated. No significant glenohumeral degenerative changes. Mild to moderate AC joint arthritis. Normal acromiohumeral distance.       ASSESSMENT:      ICD-10-CM ICD-9-CM   1. Adhesive capsulitis of right shoulder  M75.01 726.0   2. Acute pain of right shoulder  M25.511 719.41       PLAN:     -Findings and treatment options were discussed with the patient  -GH " CSI  -Therapy  -Celebrex  -RTC 8 weeks  -All questions answered      Large Joint Aspiration/Injection: R glenohumeral    Date/Time: 9/1/2022 9:30 AM  Performed by: NARA Bay MD  Authorized by: NARA Bay MD     Consent Done?:  Yes (Verbal)  Indications:  Pain  Site marked: the procedure site was marked    Timeout: prior to procedure the correct patient, procedure, and site was verified    Prep: patient was prepped and draped in usual sterile fashion      Local anesthesia used?: Yes    Local anesthetic:  Co-phenylcaine spray (0.2% Naropin)  Anesthetic total (ml):  4      Details:  Needle Size:  22 G  Approach:  Superior  Location:  Shoulder  Site:  R glenohumeral  Medications:  40 mg triamcinolone acetonide 40 mg/mL  Patient tolerance:  Patient tolerated the procedure well with no immediate complications

## 2022-09-09 ENCOUNTER — TELEPHONE (OUTPATIENT)
Dept: INTERNAL MEDICINE | Facility: CLINIC | Age: 52
End: 2022-09-09
Payer: COMMERCIAL

## 2022-09-09 NOTE — TELEPHONE ENCOUNTER
Pt calling to schedule labs for upcoming appt. Last labs drawn 2/8/22. Do you want to order any labs?

## 2022-09-09 NOTE — TELEPHONE ENCOUNTER
----- Message from Maeve De Jesus sent at 9/9/2022 12:29 PM CDT -----  Contact: Pt @[phone]  type: Lab    Caller is requesting to schedule their Lab appointment prior to annual appointment.  Order is not listed in EPIC.  Please enter order and contact patient to schedule.    Name of Caller: Sylvia Conde Date and Time of Labs: one week before     Date of Annual Physical Appointment: 2/7/2023    Where would they like the lab performed?[facility]    Would the patient rather a call back or a response via [facility]? Call back     Best Call Back Number [phone]    Additional Information: Please message pt appt date and time.

## 2022-10-17 RX ORDER — TRIAMCINOLONE ACETONIDE 40 MG/ML
40 INJECTION, SUSPENSION INTRA-ARTICULAR; INTRAMUSCULAR
Status: DISCONTINUED | OUTPATIENT
Start: 2022-09-01 | End: 2022-10-17 | Stop reason: HOSPADM

## 2023-01-01 NOTE — PROGRESS NOTES
HPI:  The patient is status post-right core needle breast biopsy  Biopsy was considered concordant; however, patient wanted to discuss the risks and benefits of six month follow up with excisional biopsy  The patients risk of cancer is no more than 1-2%  After 15 minutes of discussion and answering questions the patient has opted for observation      PHYSICAL EXAM:  Core biopsy right lateral breast  Some bruising inferiorly  Dense bilateral tissue without dominant mass  No axillary adenopathy  ASSESSMENT:  Concordant benign right breast biopsy     PLAN:  Six month follow up right mammogram      
No

## 2023-08-03 ENCOUNTER — TELEPHONE (OUTPATIENT)
Dept: INTERNAL MEDICINE | Facility: CLINIC | Age: 53
End: 2023-08-03
Payer: COMMERCIAL

## 2023-08-03 DIAGNOSIS — Z01.89 ENCOUNTER FOR ROUTINE LABORATORY TESTING: Primary | ICD-10-CM

## 2023-08-03 NOTE — TELEPHONE ENCOUNTER
----- Message from Manju Craig sent at 8/3/2023  9:35 AM CDT -----  Contact: pt [phone]  Patient scheduled their Annual Physical and is requesting labs prior to appt. Please enter order and contact patient to schedule.    Date of Annual Physical:  09/12/2023    Would the patient rather a call back or a response via [facility]? call    Thank you

## 2023-09-12 ENCOUNTER — OFFICE VISIT (OUTPATIENT)
Dept: INTERNAL MEDICINE | Facility: CLINIC | Age: 53
End: 2023-09-12
Payer: COMMERCIAL

## 2023-09-12 VITALS
OXYGEN SATURATION: 99 % | WEIGHT: 105 LBS | DIASTOLIC BLOOD PRESSURE: 64 MMHG | HEART RATE: 64 BPM | SYSTOLIC BLOOD PRESSURE: 100 MMHG | BODY MASS INDEX: 19.32 KG/M2 | HEIGHT: 62 IN

## 2023-09-12 DIAGNOSIS — N95.1 PERIMENOPAUSAL: ICD-10-CM

## 2023-09-12 DIAGNOSIS — S91.332A PUNCTURE WOUND OF LEFT HEEL, INITIAL ENCOUNTER: ICD-10-CM

## 2023-09-12 DIAGNOSIS — B35.1 ONYCHOMYCOSIS: ICD-10-CM

## 2023-09-12 DIAGNOSIS — H81.10 BENIGN PAROXYSMAL POSITIONAL VERTIGO, UNSPECIFIED LATERALITY: ICD-10-CM

## 2023-09-12 DIAGNOSIS — Z00.00 ANNUAL PHYSICAL EXAM: Primary | ICD-10-CM

## 2023-09-12 DIAGNOSIS — F41.1 GAD (GENERALIZED ANXIETY DISORDER): ICD-10-CM

## 2023-09-12 DIAGNOSIS — Z12.31 BREAST CANCER SCREENING BY MAMMOGRAM: ICD-10-CM

## 2023-09-12 PROCEDURE — 1160F PR REVIEW ALL MEDS BY PRESCRIBER/CLIN PHARMACIST DOCUMENTED: ICD-10-PCS | Mod: CPTII,S$GLB,, | Performed by: INTERNAL MEDICINE

## 2023-09-12 PROCEDURE — 99396 PR PREVENTIVE VISIT,EST,40-64: ICD-10-PCS | Mod: S$GLB,,, | Performed by: INTERNAL MEDICINE

## 2023-09-12 PROCEDURE — 1160F RVW MEDS BY RX/DR IN RCRD: CPT | Mod: CPTII,S$GLB,, | Performed by: INTERNAL MEDICINE

## 2023-09-12 PROCEDURE — 99999 PR PBB SHADOW E&M-EST. PATIENT-LVL IV: ICD-10-PCS | Mod: PBBFAC,,, | Performed by: INTERNAL MEDICINE

## 2023-09-12 PROCEDURE — 1159F PR MEDICATION LIST DOCUMENTED IN MEDICAL RECORD: ICD-10-PCS | Mod: CPTII,S$GLB,, | Performed by: INTERNAL MEDICINE

## 2023-09-12 PROCEDURE — 1159F MED LIST DOCD IN RCRD: CPT | Mod: CPTII,S$GLB,, | Performed by: INTERNAL MEDICINE

## 2023-09-12 PROCEDURE — 3008F BODY MASS INDEX DOCD: CPT | Mod: CPTII,S$GLB,, | Performed by: INTERNAL MEDICINE

## 2023-09-12 PROCEDURE — 3078F DIAST BP <80 MM HG: CPT | Mod: CPTII,S$GLB,, | Performed by: INTERNAL MEDICINE

## 2023-09-12 PROCEDURE — 3078F PR MOST RECENT DIASTOLIC BLOOD PRESSURE < 80 MM HG: ICD-10-PCS | Mod: CPTII,S$GLB,, | Performed by: INTERNAL MEDICINE

## 2023-09-12 PROCEDURE — 99396 PREV VISIT EST AGE 40-64: CPT | Mod: S$GLB,,, | Performed by: INTERNAL MEDICINE

## 2023-09-12 PROCEDURE — 3074F SYST BP LT 130 MM HG: CPT | Mod: CPTII,S$GLB,, | Performed by: INTERNAL MEDICINE

## 2023-09-12 PROCEDURE — 3074F PR MOST RECENT SYSTOLIC BLOOD PRESSURE < 130 MM HG: ICD-10-PCS | Mod: CPTII,S$GLB,, | Performed by: INTERNAL MEDICINE

## 2023-09-12 PROCEDURE — 3008F PR BODY MASS INDEX (BMI) DOCUMENTED: ICD-10-PCS | Mod: CPTII,S$GLB,, | Performed by: INTERNAL MEDICINE

## 2023-09-12 PROCEDURE — 99999 PR PBB SHADOW E&M-EST. PATIENT-LVL IV: CPT | Mod: PBBFAC,,, | Performed by: INTERNAL MEDICINE

## 2023-09-12 RX ORDER — MECLIZINE HYDROCHLORIDE 25 MG/1
25 TABLET ORAL 3 TIMES DAILY PRN
Qty: 30 TABLET | Refills: 1 | Status: SHIPPED | OUTPATIENT
Start: 2023-09-12

## 2023-09-12 RX ORDER — TERBINAFINE HYDROCHLORIDE 250 MG/1
250 TABLET ORAL DAILY
Qty: 90 TABLET | Refills: 0 | Status: SHIPPED | OUTPATIENT
Start: 2023-09-12 | End: 2023-12-11

## 2023-09-12 NOTE — PROGRESS NOTES
"    CHIEF COMPLAINT     Chief Complaint   Patient presents with    Annual Exam       HPI     Sylvia Chua is a 53 y.o. female here today for exam    History of episodic vertigo    Toenail fungus left great toe    Stepped on a nail couple weeks ago current on tetanus healing    Personally Reviewed Patient's Medical, surgical, family and social hx. Changes updated in [facility].  Care Team updated in Epic    Review of Systems:  Review of Systems    Health Maintenance:   Reviewed with patient  Due for the following:      PHYSICAL EXAM     /64 (BP Location: Right arm, Patient Position: Sitting, BP Method: Medium (Manual))   Pulse 64   Ht 5' 2" (1.575 m)   Wt 47.6 kg (105 lb)   SpO2 99%   BMI 19.20 kg/m²     Gen: Well Appearing, NAD  HEENT: PERR, EOMI  Neck: FROM, no thyromegaly, no cervical adenopathy  CVD: RRR, no M/R/G  Pulm: Normal work of breathing, CTAB, no wheezing  Abd:  Soft, NT, ND non TTP, no mass  MSK: no LE edema  Neuro: A&Ox3, gait normal, speech normal  Mood; Mood normal, behavior normal, thought process linear       LABS     Labs reviewed; Notable for    ASSESSMENT     1. Annual physical exam        2. Breast cancer screening by mammogram  Mammo Digital Screening Bilat w/ Fernando      3. Benign paroxysmal positional vertigo, unspecified laterality  meclizine (ANTIVERT) 25 mg tablet      4. Puncture wound of left heel, initial encounter        5. Onychomycosis-Great toe left foot  terbinafine HCL (LAMISIL) 250 mg tablet      6. Perimenopausal        7. MISTY (generalized anxiety disorder)  Ambulatory referral/consult to Behavioral Health              Plan     Sylvia Chua is a 53 y.o. female with  1. Annual physical exam    Updated problem list, medical history, care team and discussed HM.     2. Breast cancer screening by mammogram  - Mammo Digital Screening Bilat w/ Fernando; Future    3. Benign paroxysmal positional vertigo, unspecified laterality  Given exercises to try when symptoms " recur  - meclizine (ANTIVERT) 25 mg tablet; Take 1 tablet (25 mg total) by mouth 3 (three) times daily as needed for Dizziness.  Dispense: 30 tablet; Refill: 1    4. Puncture wound of left heel, initial encounter  No signs of infection at this time.  Continue to monitor can reassess    5. Onychomycosis-Great toe left foot  Will treat for 90 days  Will repeat LFTs after seeing LFTs part way through treatment  - terbinafine HCL (LAMISIL) 250 mg tablet; Take 1 tablet (250 mg total) by mouth once daily.  Dispense: 90 tablet; Refill: 0    6. Perimenopausal  Can follow up with gyn regarding HRT evaluation    7. MISTY (generalized anxiety disorder)  Lot of health related anxiety  Will refer back to previous provider  - Ambulatory referral/consult to Behavioral Health; Future      Cornelius Chan MD

## 2023-09-14 ENCOUNTER — LAB VISIT (OUTPATIENT)
Dept: LAB | Facility: HOSPITAL | Age: 53
End: 2023-09-14
Attending: INTERNAL MEDICINE
Payer: COMMERCIAL

## 2023-09-14 DIAGNOSIS — Z01.89 ENCOUNTER FOR ROUTINE LABORATORY TESTING: ICD-10-CM

## 2023-09-14 LAB
ALBUMIN SERPL BCP-MCNC: 3.9 G/DL (ref 3.5–5.2)
ALP SERPL-CCNC: 65 U/L (ref 55–135)
ALT SERPL W/O P-5'-P-CCNC: 16 U/L (ref 10–44)
ANION GAP SERPL CALC-SCNC: 10 MMOL/L (ref 8–16)
AST SERPL-CCNC: 20 U/L (ref 10–40)
BASOPHILS # BLD AUTO: 0.05 K/UL (ref 0–0.2)
BASOPHILS NFR BLD: 0.9 % (ref 0–1.9)
BILIRUB SERPL-MCNC: 0.3 MG/DL (ref 0.1–1)
BUN SERPL-MCNC: 11 MG/DL (ref 6–20)
CALCIUM SERPL-MCNC: 9.3 MG/DL (ref 8.7–10.5)
CHLORIDE SERPL-SCNC: 107 MMOL/L (ref 95–110)
CHOLEST SERPL-MCNC: 189 MG/DL (ref 120–199)
CHOLEST/HDLC SERPL: 4.1 {RATIO} (ref 2–5)
CO2 SERPL-SCNC: 23 MMOL/L (ref 23–29)
CREAT SERPL-MCNC: 0.8 MG/DL (ref 0.5–1.4)
DIFFERENTIAL METHOD: ABNORMAL
EOSINOPHIL # BLD AUTO: 0.1 K/UL (ref 0–0.5)
EOSINOPHIL NFR BLD: 2.4 % (ref 0–8)
ERYTHROCYTE [DISTWIDTH] IN BLOOD BY AUTOMATED COUNT: 12.3 % (ref 11.5–14.5)
EST. GFR  (NO RACE VARIABLE): >60 ML/MIN/1.73 M^2
ESTIMATED AVG GLUCOSE: 105 MG/DL (ref 68–131)
GLUCOSE SERPL-MCNC: 88 MG/DL (ref 70–110)
HBA1C MFR BLD: 5.3 % (ref 4–5.6)
HCT VFR BLD AUTO: 38.7 % (ref 37–48.5)
HDLC SERPL-MCNC: 46 MG/DL (ref 40–75)
HDLC SERPL: 24.3 % (ref 20–50)
HGB BLD-MCNC: 13.6 G/DL (ref 12–16)
IMM GRANULOCYTES # BLD AUTO: 0.02 K/UL (ref 0–0.04)
IMM GRANULOCYTES NFR BLD AUTO: 0.4 % (ref 0–0.5)
LDLC SERPL CALC-MCNC: 122.4 MG/DL (ref 63–159)
LYMPHOCYTES # BLD AUTO: 1.2 K/UL (ref 1–4.8)
LYMPHOCYTES NFR BLD: 22.6 % (ref 18–48)
MCH RBC QN AUTO: 31.5 PG (ref 27–31)
MCHC RBC AUTO-ENTMCNC: 35.1 G/DL (ref 32–36)
MCV RBC AUTO: 90 FL (ref 82–98)
MONOCYTES # BLD AUTO: 0.9 K/UL (ref 0.3–1)
MONOCYTES NFR BLD: 16.9 % (ref 4–15)
NEUTROPHILS # BLD AUTO: 3 K/UL (ref 1.8–7.7)
NEUTROPHILS NFR BLD: 56.8 % (ref 38–73)
NONHDLC SERPL-MCNC: 143 MG/DL
NRBC BLD-RTO: 0 /100 WBC
PLATELET # BLD AUTO: 182 K/UL (ref 150–450)
PMV BLD AUTO: 11.4 FL (ref 9.2–12.9)
POTASSIUM SERPL-SCNC: 4.3 MMOL/L (ref 3.5–5.1)
PROT SERPL-MCNC: 6.5 G/DL (ref 6–8.4)
RBC # BLD AUTO: 4.32 M/UL (ref 4–5.4)
SODIUM SERPL-SCNC: 140 MMOL/L (ref 136–145)
T4 FREE SERPL-MCNC: 0.68 NG/DL (ref 0.71–1.51)
TRIGL SERPL-MCNC: 103 MG/DL (ref 30–150)
TSH SERPL DL<=0.005 MIU/L-ACNC: 4.21 UIU/ML (ref 0.4–4)
WBC # BLD AUTO: 5.32 K/UL (ref 3.9–12.7)

## 2023-09-14 PROCEDURE — 84443 ASSAY THYROID STIM HORMONE: CPT | Performed by: INTERNAL MEDICINE

## 2023-09-14 PROCEDURE — 83036 HEMOGLOBIN GLYCOSYLATED A1C: CPT | Performed by: INTERNAL MEDICINE

## 2023-09-14 PROCEDURE — 80053 COMPREHEN METABOLIC PANEL: CPT | Performed by: INTERNAL MEDICINE

## 2023-09-14 PROCEDURE — 85025 COMPLETE CBC W/AUTO DIFF WBC: CPT | Performed by: INTERNAL MEDICINE

## 2023-09-14 PROCEDURE — 80061 LIPID PANEL: CPT | Performed by: INTERNAL MEDICINE

## 2023-09-14 PROCEDURE — 84439 ASSAY OF FREE THYROXINE: CPT | Performed by: INTERNAL MEDICINE

## 2023-09-14 PROCEDURE — 36415 COLL VENOUS BLD VENIPUNCTURE: CPT | Performed by: INTERNAL MEDICINE

## 2023-09-26 ENCOUNTER — HOSPITAL ENCOUNTER (OUTPATIENT)
Dept: RADIOLOGY | Facility: HOSPITAL | Age: 53
Discharge: HOME | End: 2023-09-26
Attending: INTERNAL MEDICINE
Payer: COMMERCIAL

## 2023-09-26 DIAGNOSIS — Z12.31 BREAST CANCER SCREENING BY MAMMOGRAM: ICD-10-CM

## 2023-09-26 PROCEDURE — 77063 MAMMO DIGITAL SCREENING BILAT WITH TOMO: ICD-10-PCS | Mod: 26,,, | Performed by: RADIOLOGY

## 2023-09-26 PROCEDURE — 77067 MAMMO DIGITAL SCREENING BILAT WITH TOMO: ICD-10-PCS | Mod: 26,,, | Performed by: RADIOLOGY

## 2023-09-26 PROCEDURE — 77067 SCR MAMMO BI INCL CAD: CPT | Mod: 26,,, | Performed by: RADIOLOGY

## 2023-09-26 PROCEDURE — 77067 SCR MAMMO BI INCL CAD: CPT | Mod: TC

## 2023-09-26 PROCEDURE — 77063 BREAST TOMOSYNTHESIS BI: CPT | Mod: 26,,, | Performed by: RADIOLOGY

## 2024-06-10 ENCOUNTER — PATIENT MESSAGE (OUTPATIENT)
Dept: INTERNAL MEDICINE | Facility: CLINIC | Age: 54
End: 2024-06-10
Payer: COMMERCIAL

## 2024-08-30 ENCOUNTER — PATIENT OUTREACH (OUTPATIENT)
Dept: ADMINISTRATIVE | Facility: HOSPITAL | Age: 54
End: 2024-08-30
Payer: COMMERCIAL

## 2024-08-30 NOTE — PROGRESS NOTES
Health Maintenance Due   Topic Date Due    COVID-19 Vaccine (4 - 2023-24 season) 09/01/2023    Mammogram  09/26/2024       Chart reviewed and updated. Reconciled immunizations.    Bonita Brown LPN   Clinical Care Coordinator  Primary Care and Wellness

## 2024-09-30 ENCOUNTER — OFFICE VISIT (OUTPATIENT)
Dept: INTERNAL MEDICINE | Facility: CLINIC | Age: 54
End: 2024-09-30
Payer: COMMERCIAL

## 2024-09-30 VITALS
DIASTOLIC BLOOD PRESSURE: 60 MMHG | OXYGEN SATURATION: 97 % | WEIGHT: 98.75 LBS | HEART RATE: 77 BPM | SYSTOLIC BLOOD PRESSURE: 100 MMHG | HEIGHT: 62 IN | BODY MASS INDEX: 18.17 KG/M2

## 2024-09-30 DIAGNOSIS — Z00.00 ANNUAL PHYSICAL EXAM: Primary | ICD-10-CM

## 2024-09-30 DIAGNOSIS — Z13.6 ENCOUNTER FOR SCREENING FOR CARDIOVASCULAR DISORDERS: ICD-10-CM

## 2024-09-30 DIAGNOSIS — Z12.31 BREAST CANCER SCREENING BY MAMMOGRAM: ICD-10-CM

## 2024-09-30 DIAGNOSIS — H91.93 BILATERAL HEARING LOSS, UNSPECIFIED HEARING LOSS TYPE: ICD-10-CM

## 2024-09-30 DIAGNOSIS — T63.441A BEE STING REACTION, ACCIDENTAL OR UNINTENTIONAL, INITIAL ENCOUNTER: ICD-10-CM

## 2024-09-30 PROCEDURE — 1159F MED LIST DOCD IN RCRD: CPT | Mod: CPTII,S$GLB,, | Performed by: INTERNAL MEDICINE

## 2024-09-30 PROCEDURE — 99999 PR PBB SHADOW E&M-EST. PATIENT-LVL V: CPT | Mod: PBBFAC,,, | Performed by: INTERNAL MEDICINE

## 2024-09-30 PROCEDURE — 3074F SYST BP LT 130 MM HG: CPT | Mod: CPTII,S$GLB,, | Performed by: INTERNAL MEDICINE

## 2024-09-30 PROCEDURE — 1160F RVW MEDS BY RX/DR IN RCRD: CPT | Mod: CPTII,S$GLB,, | Performed by: INTERNAL MEDICINE

## 2024-09-30 PROCEDURE — 3008F BODY MASS INDEX DOCD: CPT | Mod: CPTII,S$GLB,, | Performed by: INTERNAL MEDICINE

## 2024-09-30 PROCEDURE — 99396 PREV VISIT EST AGE 40-64: CPT | Mod: S$GLB,,, | Performed by: INTERNAL MEDICINE

## 2024-09-30 PROCEDURE — 3078F DIAST BP <80 MM HG: CPT | Mod: CPTII,S$GLB,, | Performed by: INTERNAL MEDICINE

## 2024-09-30 NOTE — PROGRESS NOTES
"    CHIEF COMPLAINT     Chief Complaint   Patient presents with    Annual Exam       HPI     Sylvia Chua is a 54 y.o. female here today for    Back at work  ([employer]ening at [employer])    Bee sting today  Localized site reaction    Sleep onset latency- 2-3 hours.       Personally Reviewed Patient's Medical, surgical, family and social hx. Changes updated in [facility].  Care Team updated in Epic    Review of Systems:  Review of Systems   Constitutional:  Negative for fatigue and fever.   Respiratory:  Negative for cough and shortness of breath.    Skin:  Positive for rash.       Health Maintenance:   Reviewed with patient  Due for the following:      PHYSICAL EXAM     /60 (BP Location: Right arm, Patient Position: Sitting)   Pulse 77   Ht 5' 2" (1.575 m)   Wt 44.8 kg (98 lb 12.3 oz)   SpO2 97%   BMI 18.06 kg/m²     Gen: Well Appearing, NAD  HEENT: PERR, EOMI  Neck: FROM, no thyromegaly, no cervical adenopathy  CVD: RRR, no M/R/G  Pulm: Normal work of breathing, CTAB, no wheezing  Abd:  Soft, NT, ND non TTP, no mass  MSK: no LE edema  Neuro: A&Ox3, gait normal, speech normal  Mood; Mood normal, behavior normal, thought process linear       LABS     Labs reviewed; Notable for    ASSESSMENT     1. Annual physical exam  CBC Auto Differential    Comprehensive Metabolic Panel    Hemoglobin A1C    Lipid Panel    TSH      2. Bee sting reaction, accidental or unintentional, initial encounter        3. Breast cancer screening by mammogram  Mammo Digital Screening Bilat      4. Bilateral hearing loss, unspecified hearing loss type  Ambulatory referral/consult to ENT    Ambulatory referral/consult to Audiology      5. Encounter for screening for cardiovascular disorders  CT Cardiac Scoring              Plan     Sylvia Chua is a 54 y.o. female with  1. Annual physical exam  Updated problem list, medical history, care team and discussed HM.     - CBC Auto Differential; Future  - Comprehensive Metabolic " Panel; Future  - Hemoglobin A1C; Future  - Lipid Panel; Future  - TSH; Future    2. Bee sting reaction, accidental or unintentional, initial encounter  Localized site reaction  Recommend use of antihistamine  As EpiPen since she has had history of more systemic reaction in the past    3. Breast cancer screening by mammogram  - Mammo Digital Screening Bilat; Future    4. Bilateral hearing loss, unspecified hearing loss type  - Ambulatory referral/consult to ENT; Future  - Ambulatory referral/consult to Audiology; Future    5. Encounter for screening for cardiovascular disorders  - CT Cardiac Scoring; Future      Cornelius Chan MD

## 2024-10-05 ENCOUNTER — LAB VISIT (OUTPATIENT)
Dept: LAB | Facility: HOSPITAL | Age: 54
End: 2024-10-05
Attending: INTERNAL MEDICINE
Payer: COMMERCIAL

## 2024-10-05 DIAGNOSIS — Z00.00 ANNUAL PHYSICAL EXAM: ICD-10-CM

## 2024-10-05 LAB
ALBUMIN SERPL BCP-MCNC: 3.7 G/DL (ref 3.5–5.2)
ALP SERPL-CCNC: 64 U/L (ref 55–135)
ALT SERPL W/O P-5'-P-CCNC: 17 U/L (ref 10–44)
ANION GAP SERPL CALC-SCNC: 9 MMOL/L (ref 8–16)
AST SERPL-CCNC: 25 U/L (ref 10–40)
BASOPHILS # BLD AUTO: 0.04 K/UL (ref 0–0.2)
BASOPHILS NFR BLD: 0.5 % (ref 0–1.9)
BILIRUB SERPL-MCNC: 0.5 MG/DL (ref 0.1–1)
BUN SERPL-MCNC: 9 MG/DL (ref 6–20)
CALCIUM SERPL-MCNC: 9.3 MG/DL (ref 8.7–10.5)
CHLORIDE SERPL-SCNC: 110 MMOL/L (ref 95–110)
CHOLEST SERPL-MCNC: 160 MG/DL (ref 120–199)
CHOLEST/HDLC SERPL: 3.1 {RATIO} (ref 2–5)
CO2 SERPL-SCNC: 25 MMOL/L (ref 23–29)
CREAT SERPL-MCNC: 0.7 MG/DL (ref 0.5–1.4)
DIFFERENTIAL METHOD BLD: ABNORMAL
EOSINOPHIL # BLD AUTO: 0.2 K/UL (ref 0–0.5)
EOSINOPHIL NFR BLD: 2.8 % (ref 0–8)
ERYTHROCYTE [DISTWIDTH] IN BLOOD BY AUTOMATED COUNT: 13 % (ref 11.5–14.5)
EST. GFR  (NO RACE VARIABLE): >60 ML/MIN/1.73 M^2
ESTIMATED AVG GLUCOSE: 103 MG/DL (ref 68–131)
GLUCOSE SERPL-MCNC: 91 MG/DL (ref 70–110)
HBA1C MFR BLD: 5.2 % (ref 4–5.6)
HCT VFR BLD AUTO: 36.6 % (ref 37–48.5)
HDLC SERPL-MCNC: 52 MG/DL (ref 40–75)
HDLC SERPL: 32.5 % (ref 20–50)
HGB BLD-MCNC: 12.1 G/DL (ref 12–16)
IMM GRANULOCYTES # BLD AUTO: 0.03 K/UL (ref 0–0.04)
IMM GRANULOCYTES NFR BLD AUTO: 0.4 % (ref 0–0.5)
LDLC SERPL CALC-MCNC: 92.4 MG/DL (ref 63–159)
LYMPHOCYTES # BLD AUTO: 1.1 K/UL (ref 1–4.8)
LYMPHOCYTES NFR BLD: 14.1 % (ref 18–48)
MCH RBC QN AUTO: 31.2 PG (ref 27–31)
MCHC RBC AUTO-ENTMCNC: 33.1 G/DL (ref 32–36)
MCV RBC AUTO: 94 FL (ref 82–98)
MONOCYTES # BLD AUTO: 0.6 K/UL (ref 0.3–1)
MONOCYTES NFR BLD: 7.2 % (ref 4–15)
NEUTROPHILS # BLD AUTO: 5.9 K/UL (ref 1.8–7.7)
NEUTROPHILS NFR BLD: 75 % (ref 38–73)
NONHDLC SERPL-MCNC: 108 MG/DL
NRBC BLD-RTO: 0 /100 WBC
PLATELET # BLD AUTO: 240 K/UL (ref 150–450)
PMV BLD AUTO: 11.4 FL (ref 9.2–12.9)
POTASSIUM SERPL-SCNC: 4 MMOL/L (ref 3.5–5.1)
PROT SERPL-MCNC: 6.2 G/DL (ref 6–8.4)
RBC # BLD AUTO: 3.88 M/UL (ref 4–5.4)
SODIUM SERPL-SCNC: 144 MMOL/L (ref 136–145)
TRIGL SERPL-MCNC: 78 MG/DL (ref 30–150)
TSH SERPL DL<=0.005 MIU/L-ACNC: 2.22 UIU/ML (ref 0.4–4)
WBC # BLD AUTO: 7.87 K/UL (ref 3.9–12.7)

## 2024-10-05 PROCEDURE — 84443 ASSAY THYROID STIM HORMONE: CPT | Performed by: INTERNAL MEDICINE

## 2024-10-05 PROCEDURE — 80053 COMPREHEN METABOLIC PANEL: CPT | Performed by: INTERNAL MEDICINE

## 2024-10-05 PROCEDURE — 83036 HEMOGLOBIN GLYCOSYLATED A1C: CPT | Performed by: INTERNAL MEDICINE

## 2024-10-05 PROCEDURE — 85025 COMPLETE CBC W/AUTO DIFF WBC: CPT | Performed by: INTERNAL MEDICINE

## 2024-10-05 PROCEDURE — 80061 LIPID PANEL: CPT | Performed by: INTERNAL MEDICINE

## 2024-10-05 PROCEDURE — 36415 COLL VENOUS BLD VENIPUNCTURE: CPT | Performed by: INTERNAL MEDICINE

## 2025-04-14 ENCOUNTER — LAB VISIT (OUTPATIENT)
Dept: LAB | Facility: HOSPITAL | Age: 55
End: 2025-04-14
Attending: INTERNAL MEDICINE
Payer: COMMERCIAL

## 2025-04-14 ENCOUNTER — OFFICE VISIT (OUTPATIENT)
Dept: INTERNAL MEDICINE | Facility: CLINIC | Age: 55
End: 2025-04-14
Payer: COMMERCIAL

## 2025-04-14 VITALS
HEIGHT: 62 IN | SYSTOLIC BLOOD PRESSURE: 120 MMHG | BODY MASS INDEX: 17.93 KG/M2 | OXYGEN SATURATION: 99 % | HEART RATE: 78 BPM | WEIGHT: 97.44 LBS | DIASTOLIC BLOOD PRESSURE: 70 MMHG

## 2025-04-14 DIAGNOSIS — L29.0 ANAL PRURITUS: ICD-10-CM

## 2025-04-14 DIAGNOSIS — L29.0 ANAL PRURITUS: Primary | ICD-10-CM

## 2025-04-14 LAB
ABSOLUTE EOSINOPHIL (OHS): 0.2 K/UL
ABSOLUTE MONOCYTE (OHS): 0.63 K/UL (ref 0.3–1)
ABSOLUTE NEUTROPHIL COUNT (OHS): 5.44 K/UL (ref 1.8–7.7)
BASOPHILS # BLD AUTO: 0.09 K/UL
BASOPHILS NFR BLD AUTO: 1.1 %
ERYTHROCYTE [DISTWIDTH] IN BLOOD BY AUTOMATED COUNT: 12.4 % (ref 11.5–14.5)
HCT VFR BLD AUTO: 39.5 % (ref 37–48.5)
HGB BLD-MCNC: 13.1 GM/DL (ref 12–16)
IMM GRANULOCYTES # BLD AUTO: 0.02 K/UL (ref 0–0.04)
IMM GRANULOCYTES NFR BLD AUTO: 0.2 % (ref 0–0.5)
LYMPHOCYTES # BLD AUTO: 1.81 K/UL (ref 1–4.8)
MCH RBC QN AUTO: 31 PG (ref 27–31)
MCHC RBC AUTO-ENTMCNC: 33.2 G/DL (ref 32–36)
MCV RBC AUTO: 94 FL (ref 82–98)
NUCLEATED RBC (/100WBC) (OHS): 0 /100 WBC
PLATELET # BLD AUTO: 218 K/UL (ref 150–450)
PMV BLD AUTO: 11.4 FL (ref 9.2–12.9)
RBC # BLD AUTO: 4.22 M/UL (ref 4–5.4)
RELATIVE EOSINOPHIL (OHS): 2.4 %
RELATIVE LYMPHOCYTE (OHS): 22.1 % (ref 18–48)
RELATIVE MONOCYTE (OHS): 7.7 % (ref 4–15)
RELATIVE NEUTROPHIL (OHS): 66.5 % (ref 38–73)
WBC # BLD AUTO: 8.19 K/UL (ref 3.9–12.7)

## 2025-04-14 PROCEDURE — 3078F DIAST BP <80 MM HG: CPT | Mod: CPTII,S$GLB,, | Performed by: INTERNAL MEDICINE

## 2025-04-14 PROCEDURE — 36415 COLL VENOUS BLD VENIPUNCTURE: CPT

## 2025-04-14 PROCEDURE — 99213 OFFICE O/P EST LOW 20 MIN: CPT | Mod: S$GLB,,, | Performed by: INTERNAL MEDICINE

## 2025-04-14 PROCEDURE — 1160F RVW MEDS BY RX/DR IN RCRD: CPT | Mod: CPTII,S$GLB,, | Performed by: INTERNAL MEDICINE

## 2025-04-14 PROCEDURE — 3008F BODY MASS INDEX DOCD: CPT | Mod: CPTII,S$GLB,, | Performed by: INTERNAL MEDICINE

## 2025-04-14 PROCEDURE — 1159F MED LIST DOCD IN RCRD: CPT | Mod: CPTII,S$GLB,, | Performed by: INTERNAL MEDICINE

## 2025-04-14 PROCEDURE — 85025 COMPLETE CBC W/AUTO DIFF WBC: CPT

## 2025-04-14 PROCEDURE — 99999 PR PBB SHADOW E&M-EST. PATIENT-LVL III: CPT | Mod: PBBFAC,,, | Performed by: INTERNAL MEDICINE

## 2025-04-14 PROCEDURE — 3074F SYST BP LT 130 MM HG: CPT | Mod: CPTII,S$GLB,, | Performed by: INTERNAL MEDICINE

## 2025-04-14 RX ORDER — ALBENDAZOLE 200 MG/1
TABLET, FILM COATED ORAL
Qty: 4 TABLET | Refills: 0 | Status: SHIPPED | OUTPATIENT
Start: 2025-04-14

## 2025-04-14 NOTE — PROGRESS NOTES
54 year female   Reason for the visit that has anal pruritus for the past few months at least. been an off and on issue.  Symptoms around the anus.  She that has not notice any skin abnormalities or rash.  That has been no change in bowel function.  That has stool is brown.  No rectal bleeding.  No rectal pain.  She that has not notice any change in bowel function  or how the stool looks.    She tried a cream lotion that has been given to her daughter and  for various skin conditions.  Could have been in his steroid component.  No success.    Eight days ago she took an over-the-counter medication to treat pinworms.  She feels overall better.  Although that has still a degree of itching but this is the best that she has been feeling.  She that has not seen any thing abnormal that involves the stool    Examination   Weight 97 lb   Pulse 80s  Blood pressure 120/70  On visual inspection over the anus no abnormal skin abnormalities.  Digital rectal exam, stool is brown, heme-negative.    Impression   Anal pruritus    Plan   A trial of albendazole dose of 400 mg once an empty stomach and then 2 weeks later  If that has no improvement may need to referral to colon and rectal    Of the options today that has to arrange either for CBC to see if that has eosinophil or stool analysis

## 2025-04-15 ENCOUNTER — PATIENT MESSAGE (OUTPATIENT)
Dept: INTERNAL MEDICINE | Facility: CLINIC | Age: 55
End: 2025-04-15
Payer: COMMERCIAL